# Patient Record
Sex: FEMALE | Race: WHITE | NOT HISPANIC OR LATINO | Employment: UNEMPLOYED | URBAN - METROPOLITAN AREA
[De-identification: names, ages, dates, MRNs, and addresses within clinical notes are randomized per-mention and may not be internally consistent; named-entity substitution may affect disease eponyms.]

---

## 2022-08-24 LAB
AMPHETAMINES UR QL: NEGATIVE
BUPRENORPHINE SERPL-MCNC: NEGATIVE NG/ML
C TRACH RRNA SPEC DONR QL NAA+PROBE: NEGATIVE
CANNABINOIDS SERPL QL: POSITIVE
COCAINE SERPL CFM-MCNC: NEGATIVE NG/ML
EXTERNAL ABO GROUPING: NORMAL
EXTERNAL AMPHETAMINE SCREEN URINE: NEGATIVE
EXTERNAL ANTIBODY SCREEN: NORMAL
EXTERNAL BARBITURATE SCREEN URINE: NEGATIVE
EXTERNAL BENZODIAZEPINE SCREEN URINE: NEGATIVE
EXTERNAL HEMATOCRIT: 34 %
EXTERNAL HEMOGLOBIN: 11.5 G/DL
EXTERNAL HEPATITIS B SURFACE ANTIGEN: NEGATIVE
EXTERNAL METHADONE SCREEN URINE: NEGATIVE
EXTERNAL PLATELET COUNT: 197 K/ΜL
EXTERNAL PROPOXYPHENE SCREEN URINE: NEGATIVE
EXTERNAL RH FACTOR: POSITIVE
EXTERNAL SYPHILIS RPR SCREEN: NORMAL
EXTERNAL THYROID STIMULATING HORMONE: 1.19 M[IU]/ML
HCV AB S/CO SERPL IA: REACTIVE
HIV 1+2 AB+HIV1 P24 AG SERPL QL IA: NEGATIVE
N GONORRHOEA DNA SPEC QL NAA+PROBE: NEGATIVE
OPIATES UR QL: NEGATIVE
OXYCODONE UR QL SCN: NEGATIVE
RUBV IGG SERPL IA-ACNC: NORMAL

## 2022-09-21 LAB
2ND TRIMESTER 4 SCREEN SERPL-IMP: NEGATIVE
HCV AB S/CO SERPL IA: NEGATIVE

## 2022-11-02 LAB
EXTERNAL HEMATOCRIT: 31 %
EXTERNAL HEMOGLOBIN: 10.2 G/DL
GLUCOSE 1H P 100 G GLC PO SERPL-MCNC: 131 MG/DL
PLATELET # BLD AUTO: 228.5 10*3/UL

## 2022-12-29 ENCOUNTER — TELEPHONE (OUTPATIENT)
Dept: OBSTETRICS AND GYNECOLOGY | Facility: CLINIC | Age: 22
End: 2022-12-29

## 2022-12-30 ENCOUNTER — TELEPHONE (OUTPATIENT)
Dept: OBSTETRICS AND GYNECOLOGY | Facility: CLINIC | Age: 22
End: 2022-12-30

## 2022-12-30 NOTE — TELEPHONE ENCOUNTER
Caller: BHARATH CAMARILLO  Reason for Call: PT IS CALLING TO CHECK ON THE STATUS OF HER RECORDS/TRANSFER OF CARE. PLEASE CALL PT TO UPDATE

## 2023-01-10 ENCOUNTER — INITIAL PRENATAL (OUTPATIENT)
Dept: OBSTETRICS AND GYNECOLOGY | Facility: CLINIC | Age: 23
End: 2023-01-10
Payer: COMMERCIAL

## 2023-01-10 VITALS
BODY MASS INDEX: 41.43 KG/M2 | SYSTOLIC BLOOD PRESSURE: 112 MMHG | WEIGHT: 211 LBS | HEIGHT: 60 IN | DIASTOLIC BLOOD PRESSURE: 62 MMHG

## 2023-01-10 DIAGNOSIS — Z34.83 PRENATAL CARE, SUBSEQUENT PREGNANCY, THIRD TRIMESTER: Primary | ICD-10-CM

## 2023-01-10 DIAGNOSIS — N89.8 VAGINAL DISCHARGE: ICD-10-CM

## 2023-01-10 LAB
GLUCOSE UR STRIP-MCNC: NEGATIVE MG/DL
PROT UR STRIP-MCNC: NEGATIVE MG/DL

## 2023-01-10 PROCEDURE — 99203 OFFICE O/P NEW LOW 30 MIN: CPT | Performed by: OBSTETRICS & GYNECOLOGY

## 2023-01-10 RX ORDER — FERROUS SULFATE 325(65) MG
1 TABLET ORAL AS NEEDED
COMMUNITY
Start: 2022-11-23
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
## 2023-01-13 LAB
C TRACH RRNA SPEC QL NAA+PROBE: NEGATIVE
Lab: NORMAL
N GONORRHOEA RRNA SPEC QL NAA+PROBE: NEGATIVE

## 2023-01-17 ENCOUNTER — ROUTINE PRENATAL (OUTPATIENT)
Dept: OBSTETRICS AND GYNECOLOGY | Facility: CLINIC | Age: 23
End: 2023-01-17
Payer: COMMERCIAL

## 2023-01-17 ENCOUNTER — LAB (OUTPATIENT)
Dept: LAB | Facility: HOSPITAL | Age: 23
End: 2023-01-17
Payer: COMMERCIAL

## 2023-01-17 VITALS — DIASTOLIC BLOOD PRESSURE: 62 MMHG | BODY MASS INDEX: 41.83 KG/M2 | WEIGHT: 214.2 LBS | SYSTOLIC BLOOD PRESSURE: 114 MMHG

## 2023-01-17 DIAGNOSIS — Z34.03 ENCOUNTER FOR SUPERVISION OF NORMAL FIRST PREGNANCY IN THIRD TRIMESTER: ICD-10-CM

## 2023-01-17 DIAGNOSIS — Z34.03 ENCOUNTER FOR SUPERVISION OF NORMAL FIRST PREGNANCY IN THIRD TRIMESTER: Primary | ICD-10-CM

## 2023-01-17 DIAGNOSIS — Z86.19 HISTORY OF HEPATITIS C: Primary | ICD-10-CM

## 2023-01-17 LAB
EXPIRATION DATE: 0
GLUCOSE UR STRIP-MCNC: NEGATIVE MG/DL
Lab: 0
PROT UR STRIP-MCNC: NEGATIVE MG/DL

## 2023-01-17 PROCEDURE — 99213 OFFICE O/P EST LOW 20 MIN: CPT | Performed by: NURSE PRACTITIONER

## 2023-01-17 PROCEDURE — 87081 CULTURE SCREEN ONLY: CPT

## 2023-01-17 NOTE — PROGRESS NOTES
OB FOLLOW UP  CC- Here for care of pregnancy        Vaishali Ramirez is a 22 y.o.  36w0d patient being seen today for her obstetrical follow up visit. Patient reports headache, tylenol resolves and x2 vomiting since last visit.     Her prenatal care is complicated by (and status) :   There is no problem list on file for this patient.      GBS Status: Done Today. She is not allergic to PCN.    No Known Allergies       Flu Status: Declines  Her Delivery Plan is: Undecided    US today: yes: cephalic, AF normal, HC 14%, AC 76%, EFW 44%  Non Stress Test: No.      ROS -   Patient Reports : headache and vomiting  Patient Denies: Loss of Fluid, Vaginal Spotting, Vision Changes, Nausea , Contractions and Epigastric pain  Fetal Movement : normal  All other systems reviewed and are negative.       The additional following portions of the patient's history were reviewed and updated as appropriate: allergies and current medications.    I have reviewed and agree with the HPI, ROS, and historical information as entered above. Niurka Cook, APRN      EXAM:     Prenatal Vitals  BP: 114/62  Weight: 97.2 kg (214 lb 3.2 oz)   Fetal Heart Rate: 133       Dilation/Effacement/Station  Dilation: 2      Urine Glucose Read-only: Negative  Urine Protein Read-only: Negative       Assessment and Plan    Problem List Items Addressed This Visit    None  Visit Diagnoses     History of hepatitis C    -  Primary    Relevant Orders    Hepatitis C RNA, Quantitative, PCR (graph)    US OB Follow Up Transabdominal Approach    Encounter for supervision of normal first pregnancy in third trimester        Relevant Orders    POC Glucose, Urine, Qualitative, Dipstick (Completed)    POC Protein, Urine, Qualitative, Dipstick (Completed)    Group B Streptococcus Culture - Swab, Vaginal/Rectum          1. Pregnancy at 36w 0d  2. GBS done today  3. Hepatitis C viral load labs today  4. Fetal status reassuring.   5. Reviewed Pre-eclampsia  signs/symptoms  6. Delivery options reviewed with patient  7. Signs of labor reviewed  8. Kick counts reviewed  9. Activity and Exercise discussed.  10. FU in about 1 week (1/25/23) with JOVAN Cook, APRN  01/17/2023

## 2023-01-19 LAB
HCV RNA SERPL NAA+PROBE-ACNC: NORMAL IU/ML
TEST INFORMATION: NORMAL

## 2023-01-20 LAB — BACTERIA SPEC AEROBE CULT: NORMAL

## 2023-01-25 ENCOUNTER — ROUTINE PRENATAL (OUTPATIENT)
Dept: OBSTETRICS AND GYNECOLOGY | Facility: CLINIC | Age: 23
End: 2023-01-25
Payer: COMMERCIAL

## 2023-01-25 VITALS — WEIGHT: 214.8 LBS | DIASTOLIC BLOOD PRESSURE: 68 MMHG | SYSTOLIC BLOOD PRESSURE: 108 MMHG | BODY MASS INDEX: 41.95 KG/M2

## 2023-01-25 DIAGNOSIS — Z34.83 PRENATAL CARE, SUBSEQUENT PREGNANCY, THIRD TRIMESTER: Primary | ICD-10-CM

## 2023-01-25 LAB
GLUCOSE UR STRIP-MCNC: NEGATIVE MG/DL
PROT UR STRIP-MCNC: NEGATIVE MG/DL

## 2023-01-25 PROCEDURE — 99213 OFFICE O/P EST LOW 20 MIN: CPT | Performed by: OBSTETRICS & GYNECOLOGY

## 2023-01-25 NOTE — PROGRESS NOTES
OB FOLLOW UP  CC- Here for care of pregnancy        Vaishali Ramirez is a 22 y.o.  37w1d patient being seen today for her obstetrical follow up visit. Patient reports occasional lower back pain and BH ctx. Patient denies any dysuria.     Her prenatal care is complicated by (and status) : None  There is no problem list on file for this patient.      GBS Status:   Group B Strep Culture   Date Value Ref Range Status   2023 No Group B Streptococcus isolated  Final         No Known Allergies       Flu Status: Declines  Her Delivery Plan is: Desires spontaneous labor    US today: no  Non Stress Test: No.       ROS -   Patient Reports : see above  Patient Denies: Loss of Fluid, Vaginal Spotting, Vision Changes, Headaches, Nausea , Vomiting  and Epigastric pain  Fetal Movement : normal  All other systems reviewed and are negative.       The additional following portions of the patient's history were reviewed and updated as appropriate: allergies and current medications.    I have reviewed and agree with the HPI, ROS, and historical information as entered above. Sue Michel MD      EXAM:     Prenatal Vitals  BP: 108/68  Weight: 97.4 kg (214 lb 12.8 oz)   Fetal Heart Rate: pos          cvx internal os closed    Urine Glucose Read-only: Negative  Urine Protein Read-only: Negative       Assessment and Plan    Problem List Items Addressed This Visit    None  Visit Diagnoses     Prenatal care, subsequent pregnancy, third trimester    -  Primary    Relevant Orders    POC Urinalysis Dipstick (Completed)    HCV Antibody Rfx To Qnt PCR          1. Pregnancy at 37w1d  2. Fetal status reassuring.   3. Reviewed Pre-eclampsia signs/symptoms  4. Delivery options reviewed with patient  5. Signs of labor reviewed  6. Kick counts reviewed  7. Activity and Exercise discussed.  Return in about 1 week (around 2023).    Sue Michel MD  2023

## 2023-01-26 LAB
HCV AB S/CO SERPL IA: <0.1 S/CO RATIO (ref 0–0.9)
HCV AB SERPL QL IA: NORMAL

## 2023-01-31 ENCOUNTER — ROUTINE PRENATAL (OUTPATIENT)
Dept: OBSTETRICS AND GYNECOLOGY | Facility: CLINIC | Age: 23
End: 2023-01-31
Payer: COMMERCIAL

## 2023-01-31 VITALS — WEIGHT: 220 LBS | DIASTOLIC BLOOD PRESSURE: 72 MMHG | BODY MASS INDEX: 42.97 KG/M2 | SYSTOLIC BLOOD PRESSURE: 102 MMHG

## 2023-01-31 DIAGNOSIS — E66.01 MORBID OBESITY WITH BMI OF 40.0-44.9, ADULT: ICD-10-CM

## 2023-01-31 DIAGNOSIS — Z34.83 PRENATAL CARE, SUBSEQUENT PREGNANCY, THIRD TRIMESTER: Primary | ICD-10-CM

## 2023-01-31 LAB
GLUCOSE UR STRIP-MCNC: NEGATIVE MG/DL
PROT UR STRIP-MCNC: NEGATIVE MG/DL

## 2023-01-31 PROCEDURE — 99213 OFFICE O/P EST LOW 20 MIN: CPT | Performed by: OBSTETRICS & GYNECOLOGY

## 2023-01-31 NOTE — PROGRESS NOTES
OB FOLLOW UP  CC- Here for care of pregnancy        Vaishali Ramirez is a 22 y.o.  38w0d patient being seen today for her obstetrical follow up visit. Patient reports BH contractions, heartburn (she is taking OTC medication for treatment currently), constipation, and hemorrhoids.     Patient requesting cervical check today.    Her prenatal care is complicated by (and status) :   Patient Active Problem List   Diagnosis   • Morbid obesity with BMI of 40.0-44.9, adult ([facility])       GBS Status:   Group B Strep Culture   Date Value Ref Range Status   2023 No Group B Streptococcus isolated  Final         No Known Allergies       Flu Status: Declines  Her Delivery Plan is: Does not desire IOL. Patient states that if she is not dilated to 4-5CM by next week, then she would like to discuss IOL further.    US today: No  Non Stress Test: No       ROS -   Patient Reports : see above  Patient Denies: Loss of Fluid, Vaginal Spotting, Vision Changes, Headaches, Nausea , Vomiting  and Epigastric pain  Fetal Movement : normal  All other systems reviewed and are negative.       The additional following portions of the patient's history were reviewed and updated as appropriate: allergies and current medications.    I have reviewed and agree with the HPI, ROS, and historical information as entered above. Sue Michel MD      EXAM:     Prenatal Vitals  BP: 102/72  Weight: 99.8 kg (220 lb)   Fetal Heart Rate: pos        FHT pos  Cervix 2/50      Urine Glucose Read-only: Negative  Urine Protein Read-only: Negative       Assessment and Plan    Problem List Items Addressed This Visit        Endocrine and Metabolic    Morbid obesity with BMI of 40.0-44.9, adult ([facility])   Other Visit Diagnoses     Prenatal care, subsequent pregnancy, third trimester    -  Primary    Relevant Orders    POC Urinalysis Dipstick (Completed)          1. Pregnancy at 38w0d  2. Fetal status reassuring.   3. Reviewed Pre-eclampsia  signs/symptoms  4. Delivery options reviewed with patient.  Desires induction on due date if undelivered  5. Signs of labor reviewed  6. Kick counts reviewed  7. Activity and Exercise discussed.  Return in about 1 week (around 2/7/2023).    Sue Michel MD  01/31/2023

## 2023-02-02 ENCOUNTER — TELEPHONE (OUTPATIENT)
Dept: OBSTETRICS AND GYNECOLOGY | Facility: CLINIC | Age: 23
End: 2023-02-02
Payer: COMMERCIAL

## 2023-02-02 NOTE — TELEPHONE ENCOUNTER
PT calling because she was seen at ER for BH contractions. Was told to reach out to the office to see if she needs to schedule a f/u.

## 2023-02-05 ENCOUNTER — HOSPITAL ENCOUNTER (OUTPATIENT)
Facility: HOSPITAL | Age: 23
Discharge: HOME | End: 2023-02-06
Attending: OBSTETRICS & GYNECOLOGY | Admitting: OBSTETRICS & GYNECOLOGY
Payer: COMMERCIAL

## 2023-02-05 ENCOUNTER — HOSPITAL ENCOUNTER (OUTPATIENT)
Facility: HOSPITAL | Age: 23
Discharge: HOME | End: 2023-02-05
Attending: OBSTETRICS & GYNECOLOGY | Admitting: OBSTETRICS & GYNECOLOGY
Payer: COMMERCIAL

## 2023-02-05 VITALS
WEIGHT: 220 LBS | TEMPERATURE: 98 F | RESPIRATION RATE: 20 BRPM | HEIGHT: 60 IN | DIASTOLIC BLOOD PRESSURE: 71 MMHG | BODY MASS INDEX: 43.19 KG/M2 | SYSTOLIC BLOOD PRESSURE: 112 MMHG | HEART RATE: 75 BPM

## 2023-02-05 VITALS
DIASTOLIC BLOOD PRESSURE: 72 MMHG | SYSTOLIC BLOOD PRESSURE: 112 MMHG | TEMPERATURE: 97.9 F | HEART RATE: 88 BPM | RESPIRATION RATE: 18 BRPM

## 2023-02-05 PROBLEM — Z34.90 PREGNANCY: Status: ACTIVE | Noted: 2023-02-05

## 2023-02-05 PROCEDURE — 59025 FETAL NON-STRESS TEST: CPT

## 2023-02-05 PROCEDURE — 99212 OFFICE O/P EST SF 10 MIN: CPT | Performed by: OBSTETRICS & GYNECOLOGY

## 2023-02-05 PROCEDURE — G0463 HOSPITAL OUTPT CLINIC VISIT: HCPCS

## 2023-02-05 PROCEDURE — 59025 FETAL NON-STRESS TEST: CPT | Performed by: OBSTETRICS & GYNECOLOGY

## 2023-02-05 NOTE — H&P
[facility]  Obstetric History and Physical    Referring Provider: Sue Michel MD      Chief Complaint   Patient presents with   • Contractions     Started having ctxs at 0341.  Have been 3 min apart and lasting 3 min.  Now 2 min apart.         Subjective        Patient is a 22 y.o. female  currently at 38w5d, who presents with complaint of contractions.  Patient reports mild to moderate contractions that began at 341 this morning, every 3 minutes without any associated leaking of fluid or vaginal bleeding.  Patient denies recent trauma, fever, urinary symptoms or any other concerns.  Patient reports normal fetal activity.  Patient observed for several hours without cervical change with irregular contractions.  GBS negative        The following portions of the patients history were reviewed and updated as appropriate: current medications, allergies, past medical history, past surgical history, past family history, past social history and problem list .       Prenatal Information:   Maternal Prenatal Labs  Blood Type No results found for: ABO   Rh Status No results found for: RH   Antibody Screen No results found for: ABSCRN   Gonnorhea No results found for: GCCX   Chlamydia No results found for: CLAMYDCU   RPR No results found for: RPR   Syphilis Antibody No results found for: SYPHILIS   Rubella No results found for: RUBELLAIGGIN   Hepatitis B Surface Antigen No results found for: HEPBSAG   HIV-1 Antibody No results found for: LABHIV1   Hepatitis C Antibody No results found for: HEPCAB   Rapid Urin Drug Screen No results found for: AMPMETHU, BARBITSCNUR, LABBENZSCN, LABMETHSCN, LABOPIASCN, THCURSCR, COCAINEUR, AMPHETSCREEN, PROPOXSCN, BUPRENORSCNU, METAMPSCNUR, OXYCODONESCN, TRICYCLICSCN   Group B Strep Culture No results found for: GBSANTIGEN           External Prenatal Results     Pregnancy Outside Results - Transcribed From Office Records - See Scanned Records For Details     Test Value Date Time     ABO ^ AB  22     Rh ^ Positive  22     Antibody Screen ^ Normal  22     Varicella IgG       Rubella ^ Immune  22     Hgb ^ 10.2 g/dL 22       ^ 11.5 g/dL 22     Hct ^ 31 % 22       ^ 34 % 22     Glucose Fasting GTT       Glucose Tolerance Test 1 hour ^ 131  22     Glucose Tolerance Test 3 hour       Gonorrhea (discrete)  Negative  01/10/23       ^ Negative  22     Chlamydia (discrete)  Negative  01/10/23       ^ Negative  22     RPR ^ Non-Reactive  22     VDRL       Syphilis Antibody       HBsAg ^ Negative  22     Herpes Simplex Virus PCR       Herpes Simplex VIrus Culture       HIV ^ Negative  22     Hep C RNA Quant PCR       Hep C Antibody ^ Negative  22       ^ Reactive  22     AFP       Group B Strep  No Group B Streptococcus isolated  23 1806    GBS Susceptibility to Clindamycin       GBS Susceptibility to Erythromycin       Fetal Fibronectin       Genetic Testing, Maternal Blood             Drug Screening     Test Value Date Time    Urine Drug Screen       Amphetamine Screen ^ Negative  22     Barbiturate Screen ^ Negative  22     Benzodiazepine Screen ^ Negative  22     Methadone Screen ^ Negative  22     Phencyclidine Screen       Opiates Screen ^ Negative  22     THC Screen ^ Positive  22     Cocaine Screen ^ Negative  22     Propoxyphene Screen ^ Negative  22     Buprenorphine Screen ^ Negative  22     Methamphetamine Screen       Oxycodone Screen ^ Negative  22     Tricyclic Antidepressants Screen             Legend    ^: Historical                          Past OB History:       OB History    Para Term  AB Living   2 1 1 0 0 1   SAB IAB Ectopic Molar Multiple Live Births   0 0 0 0 0 1      # Outcome Date GA Lbr Jabari/2nd Weight Sex Delivery Anes PTL Lv   2 Current            1 Term 22 40w1d  3685 g (8 lb 2 oz) M Vag-Spont   MICHELLE        Past Medical History: History reviewed. No pertinent past medical history.   Past Surgical History Past Surgical History:   Procedure Laterality Date   • TONSILLECTOMY AND ADENOIDECTOMY        Family History: Family History   Problem Relation Age of Onset   • Breast cancer Neg Hx    • Colon cancer Neg Hx    • Ovarian cancer Neg Hx    • Uterine cancer Neg Hx       Social History:  reports that she quit smoking about 1 years ago. Her smoking use included cigarettes. She has never used smokeless tobacco.   reports that she does not currently use alcohol.   reports no history of drug use.                   General ROS Negative Findings:Headaches, Visual Changes, Epigastric pain, Anorexia, Nausia/Vomiting, ROM and Vaginal Bleeding    ROS     All other systems have been reviewed and are neg  Objective       Vital Signs Range for the last 24 hours  Temperature: Temp:  [98 °F (36.7 °C)] 98 °F (36.7 °C)   Temp Source: Temp src: Oral   BP: BP: (112)/(71) 112/71   Pulse: Heart Rate:  [75] 75   Respirations: Resp:  [20] 20   SPO2:     O2 Amount (l/min):     O2 Devices     Weight: Weight:  [99.8 kg (220 lb)] 99.8 kg (220 lb)     Physical Examination:   General:   alert, appears stated age and cooperative   Skin:   normal   HEENT:  Sclera clear   Lungs:      Heart:      Gastrointestinal:  Abdomen soft gravid uterus nontender, no guarding benign exam   Lower Extremities:  Trace edema   : Exam deferred.   Musculoskeletal:     Neuro:  No focal deficit noted.         Presentation:  Vertex   Cervix: Exam by: Method: sterile exam per RN   Dilation:  3 to 4 cm   Effacement: Cervical Effacement: 60%   Station:  -2       Fetal Heart Rate Assessment   Method: Fetal HR Assessment Method: external   Beats/min: Fetal HR (beats/min): 120   Baseline: Fetal HR Baseline: normal range   Varibility: Fetal HR Variability: moderate (amplitude range 6 to 25 bpm)   Accels: Fetal HR Accelerations: greater than/equal to 15 bpm   Decels: Fetal HR  Decelerations: absent   Tracing Category:     NST-indications contractions, interpretation reactive, moderate variability, accelerations present 15 x 15, no fetal decelerations noted, onset 0444 end time 0700  Uterine Assessment   Method: Method: external tocotransducer   Frequency (min): Contraction Frequency (Minutes): 4   Ctx Count in 10 min:     Duration:     Intensity: Contraction Intensity: mild by palpation   Intensity by IUPC:     Resting Tone: Uterine Resting Tone: soft by palpation   Resting Tone by IUPC:     [place] Units:       Laboratory Results:   Lab Results (last 24 hours)     ** No results found for the last 24 hours. **        Radiology Review:   Imaging Results (Last 24 Hours)     ** No results found for the last 24 hours. **        Other Studies:    Assessment & Plan       Pregnancy    Morbid obesity with BMI of 40.0-44.9, adult ([facility])        Assessment:  1.  Intrauterine pregnancy at 38w5d weeks gestation with reactive fetal status.    2.  False labor  3.  GBS negative  4.  Morbid obesity    Plan:  1.  Discharged home, kick count, labor instructions given, follow-up with OB provider routine or.  2. Plan of care has been reviewed with patient.  3.  Risks, benefits of treatment plan have been discussed.  4.  All questions have been answered.  5      Efrain Noe,   2/5/2023  06:58 EST

## 2023-02-06 NOTE — H&P
[facility]  Obstetric History and Physical    Referring Provider: Sue Michel MD      Chief Complaint   Patient presents with   • Contractions       Subjective     Patient is a 22 y.o. female  currently at 38w5d, who presents with complaint of contractions every 5 to 7 minutes throughout the day without leaking fluid or vaginal bleeding.  Patient ports normal fetal activity.  Patient was seen earlier this morning in labor and delivery without labor.  Patient observed for 2 hours without cervical change.         The following portions of the patients history were reviewed and updated as appropriate: current medications, allergies, past medical history, past surgical history, past family history, past social history and problem list .       Prenatal Information:   Maternal Prenatal Labs  Blood Type No results found for: ABO   Rh Status No results found for: RH   Antibody Screen No results found for: ABSCRN   Gonnorhea No results found for: GCCX   Chlamydia No results found for: CLAMYDCU   RPR No results found for: RPR   Syphilis Antibody No results found for: SYPHILIS   Rubella No results found for: RUBELLAIGGIN   Hepatitis B Surface Antigen No results found for: HEPBSAG   HIV-1 Antibody No results found for: LABHIV1   Hepatitis C Antibody No results found for: HEPCAB   Rapid Urin Drug Screen No results found for: AMPMETHU, BARBITSCNUR, LABBENZSCN, LABMETHSCN, LABOPIASCN, THCURSCR, COCAINEUR, AMPHETSCREEN, PROPOXSCN, BUPRENORSCNU, METAMPSCNUR, OXYCODONESCN, TRICYCLICSCN   Group B Strep Culture No results found for: GBSANTIGEN           External Prenatal Results     Pregnancy Outside Results - Transcribed From Office Records - See Scanned Records For Details     Test Value Date Time    ABO ^ AB  22     Rh ^ Positive  22     Antibody Screen ^ Normal  22     Varicella IgG       Rubella ^ Immune  22     Hgb ^ 10.2 g/dL 22       ^ 11.5 g/dL 22     Hct ^ 31 % 22       ^  34 % 22     Glucose Fasting GTT       Glucose Tolerance Test 1 hour ^ 131  22     Glucose Tolerance Test 3 hour       Gonorrhea (discrete)  Negative  01/10/23       ^ Negative  22     Chlamydia (discrete)  Negative  01/10/23       ^ Negative  22     RPR ^ Non-Reactive  22     VDRL       Syphilis Antibody       HBsAg ^ Negative  22     Herpes Simplex Virus PCR       Herpes Simplex VIrus Culture       HIV ^ Negative  22     Hep C RNA Quant PCR       Hep C Antibody ^ Negative  22       ^ Reactive  22     AFP       Group B Strep  No Group B Streptococcus isolated  23 1806    GBS Susceptibility to Clindamycin       GBS Susceptibility to Erythromycin       Fetal Fibronectin       Genetic Testing, Maternal Blood             Drug Screening     Test Value Date Time    Urine Drug Screen       Amphetamine Screen ^ Negative  22     Barbiturate Screen ^ Negative  22     Benzodiazepine Screen ^ Negative  22     Methadone Screen ^ Negative  22     Phencyclidine Screen       Opiates Screen ^ Negative  22     THC Screen ^ Positive  22     Cocaine Screen ^ Negative  22     Propoxyphene Screen ^ Negative  22     Buprenorphine Screen ^ Negative  22     Methamphetamine Screen       Oxycodone Screen ^ Negative  22     Tricyclic Antidepressants Screen             Legend    ^: Historical                          Past OB History:       OB History    Para Term  AB Living   2 1 1 0 0 1   SAB IAB Ectopic Molar Multiple Live Births   0 0 0 0 0 1      # Outcome Date GA Lbr Jabari/2nd Weight Sex Delivery Anes PTL Lv   2 Current            1 Term 22 40w1d  3685 g (8 lb 2 oz) M Vag-Spont   MICHELLE       Past Medical History: History reviewed. No pertinent past medical history.   Past Surgical History Past Surgical History:   Procedure Laterality Date   • TONSILLECTOMY AND ADENOIDECTOMY        Family History: Family  History   Problem Relation Age of Onset   • Breast cancer Neg Hx    • Colon cancer Neg Hx    • Ovarian cancer Neg Hx    • Uterine cancer Neg Hx       Social History:  reports that she quit smoking about 1 years ago. Her smoking use included cigarettes. She has never used smokeless tobacco.   reports that she does not currently use alcohol.   reports no history of drug use.                   General ROS Negative Findings:Headaches, Visual Changes, Epigastric pain, Anorexia, Nausia/Vomiting, ROM and Vaginal Bleeding    ROS     All other systems have been reviewed and are neg  Objective       Vital Signs Range for the last 24 hours  Temperature: Temp:  [97.9 °F (36.6 °C)-98 °F (36.7 °C)] 97.9 °F (36.6 °C)   Temp Source: Temp src: Oral   BP: BP: (112)/(71-72) 112/72   Pulse: Heart Rate:  [75-88] 88   Respirations: Resp:  [18-20] 18   SPO2:     O2 Amount (l/min):     O2 Devices     Weight: Weight:  [99.8 kg (220 lb)] 99.8 kg (220 lb)     Physical Examination:   General:   alert, appears stated age and cooperative   Skin:   normal   HEENT:     Lungs:      Heart:      Gastrointestinal:  Abdomen soft, gravid uterus, guarding benign exam   Lower Extremities:  Trace edema, no calf tenderness   : External genitalia: normal general appearance  Uterus: enlarged   Musculoskeletal:     Neuro:  No focal deficits noted DTR 2 + over 4 no clonus         Presentation: vtx   Cervix: Exam by: Method: sterile exam per RN   Dilation:  3 cm   Effacement: Cervical Effacement: 60-70%   Station:         Fetal Heart Rate Assessment   Method:     Beats/min:     Baseline:     Varibility:     Accels:     Decels:     Tracing Category:     NST-indications contractions, interpretation reactive, moderate variability, multiple accelerations present 15 x 15, 1 isolated early deceleration noted, onset 2208 end time 0008, irregular contractions noted  Uterine Assessment   Method:     Frequency (min):     Ctx Count in 10 min:     Duration:     Intensity:      Intensity by IUPC:     Resting Tone:     Resting Tone by IUPC:     Lorena Units:       Laboratory Results:   Lab Results (last 24 hours)     ** No results found for the last 24 hours. **        Radiology Review:   Imaging Results (Last 24 Hours)     ** No results found for the last 24 hours. **        Other Studies:    Assessment & Plan       Pregnancy        Assessment:  1.  Intrauterine pregnancy at 38w5d weeks gestation with reactive fetal status.    2.  False labor  3.  GBS negative  4.      Plan:  1.  Discharged home, kick count, labor structures given, follow-up OB provider routinely.  Follow-up OB provider this week routine prenatal visitor prn    2. Plan of care has been reviewed with patient.  3.  Risks, benefits of treatment plan have been discussed.  4.  All questions have been answered.  5   discussed with Dr. Al Noe,   2/5/2023  22:27 EST

## 2023-02-07 ENCOUNTER — HOSPITAL ENCOUNTER (INPATIENT)
Facility: HOSPITAL | Age: 23
LOS: 2 days | Discharge: HOME | End: 2023-02-09
Attending: OBSTETRICS & GYNECOLOGY | Admitting: OBSTETRICS & GYNECOLOGY
Payer: COMMERCIAL

## 2023-02-07 ENCOUNTER — ANESTHESIA EVENT (OUTPATIENT)
Dept: LABOR AND DELIVERY | Facility: HOSPITAL | Age: 23
End: 2023-02-07
Payer: COMMERCIAL

## 2023-02-07 ENCOUNTER — ANESTHESIA (OUTPATIENT)
Dept: LABOR AND DELIVERY | Facility: HOSPITAL | Age: 23
End: 2023-02-07
Payer: COMMERCIAL

## 2023-02-07 PROBLEM — Z34.90 TERM PREGNANCY: Status: ACTIVE | Noted: 2023-02-07

## 2023-02-07 LAB
ABO GROUP BLD: NORMAL
ABO GROUP BLD: NORMAL
ALP SERPL-CCNC: 133 U/L (ref 39–117)
ALT SERPL W P-5'-P-CCNC: 6 U/L (ref 1–33)
AST SERPL-CCNC: 12 U/L (ref 1–32)
BILIRUB SERPL-MCNC: 0.2 MG/DL (ref 0–1.2)
BLD GP AB SCN SERPL QL: NEGATIVE
CREAT SERPL-MCNC: 0.73 MG/DL (ref 0.57–1)
DEPRECATED RDW RBC AUTO: 46.8 FL (ref 37–54)
ERYTHROCYTE [DISTWIDTH] IN BLOOD BY AUTOMATED COUNT: 17.2 % (ref 12.3–15.4)
HCT VFR BLD AUTO: 32.2 % (ref 34–46.6)
HGB BLD-MCNC: 10.2 G/DL (ref 12–15.9)
LDH SERPL-CCNC: 197 U/L (ref 135–214)
MCH RBC QN AUTO: 24.1 PG (ref 26.6–33)
MCHC RBC AUTO-ENTMCNC: 31.7 G/DL (ref 31.5–35.7)
MCV RBC AUTO: 76.1 FL (ref 79–97)
PLATELET # BLD AUTO: 228 10*3/MM3 (ref 140–450)
PMV BLD AUTO: 11.9 FL (ref 6–12)
RBC # BLD AUTO: 4.23 10*6/MM3 (ref 3.77–5.28)
RH BLD: POSITIVE
RH BLD: POSITIVE
T&S EXPIRATION DATE: NORMAL
URATE SERPL-MCNC: 4.9 MG/DL (ref 2.4–5.7)
WBC NRBC COR # BLD: 11.55 10*3/MM3 (ref 3.4–10.8)

## 2023-02-07 PROCEDURE — 0UQMXZZ REPAIR VULVA, EXTERNAL APPROACH: ICD-10-PCS | Performed by: OBSTETRICS & GYNECOLOGY

## 2023-02-07 PROCEDURE — C1755 CATHETER, INTRASPINAL: HCPCS | Performed by: ANESTHESIOLOGY

## 2023-02-07 PROCEDURE — 86900 BLOOD TYPING SEROLOGIC ABO: CPT | Performed by: OBSTETRICS & GYNECOLOGY

## 2023-02-07 PROCEDURE — 59410 OBSTETRICAL CARE: CPT | Performed by: OBSTETRICS & GYNECOLOGY

## 2023-02-07 PROCEDURE — 25010000002 ONDANSETRON PER 1 MG: Performed by: OBSTETRICS & GYNECOLOGY

## 2023-02-07 PROCEDURE — 86901 BLOOD TYPING SEROLOGIC RH(D): CPT | Performed by: OBSTETRICS & GYNECOLOGY

## 2023-02-07 PROCEDURE — 86900 BLOOD TYPING SEROLOGIC ABO: CPT

## 2023-02-07 PROCEDURE — 83615 LACTATE (LD) (LDH) ENZYME: CPT | Performed by: OBSTETRICS & GYNECOLOGY

## 2023-02-07 PROCEDURE — 85027 COMPLETE CBC AUTOMATED: CPT | Performed by: OBSTETRICS & GYNECOLOGY

## 2023-02-07 PROCEDURE — 84550 ASSAY OF BLOOD/URIC ACID: CPT | Performed by: OBSTETRICS & GYNECOLOGY

## 2023-02-07 PROCEDURE — 84075 ASSAY ALKALINE PHOSPHATASE: CPT | Performed by: OBSTETRICS & GYNECOLOGY

## 2023-02-07 PROCEDURE — 84450 TRANSFERASE (AST) (SGOT): CPT | Performed by: OBSTETRICS & GYNECOLOGY

## 2023-02-07 PROCEDURE — 82247 BILIRUBIN TOTAL: CPT | Performed by: OBSTETRICS & GYNECOLOGY

## 2023-02-07 PROCEDURE — 36415 COLL VENOUS BLD VENIPUNCTURE: CPT | Performed by: OBSTETRICS & GYNECOLOGY

## 2023-02-07 PROCEDURE — 25010000002 ROPIVACAINE PER 1 MG: Performed by: ANESTHESIOLOGY

## 2023-02-07 PROCEDURE — 25010000002 OXYTOCIN PER 10 UNITS: Performed by: OBSTETRICS & GYNECOLOGY

## 2023-02-07 PROCEDURE — 25010000002 FENTANYL CITRATE (PF) 50 MCG/ML SOLUTION: Performed by: ANESTHESIOLOGY

## 2023-02-07 PROCEDURE — 84460 ALANINE AMINO (ALT) (SGPT): CPT | Performed by: OBSTETRICS & GYNECOLOGY

## 2023-02-07 PROCEDURE — 86850 RBC ANTIBODY SCREEN: CPT | Performed by: OBSTETRICS & GYNECOLOGY

## 2023-02-07 PROCEDURE — 86901 BLOOD TYPING SEROLOGIC RH(D): CPT

## 2023-02-07 PROCEDURE — 82565 ASSAY OF CREATININE: CPT | Performed by: OBSTETRICS & GYNECOLOGY

## 2023-02-07 RX ORDER — ROPIVACAINE HYDROCHLORIDE 2 MG/ML
15 INJECTION, SOLUTION EPIDURAL; INFILTRATION; PERINEURAL CONTINUOUS
Status: DISCONTINUED | OUTPATIENT
Start: 2023-02-07 | End: 2023-02-08

## 2023-02-07 RX ORDER — BUPIVACAINE HYDROCHLORIDE 2.5 MG/ML
INJECTION, SOLUTION EPIDURAL; INFILTRATION; INTRACAUDAL AS NEEDED
Status: DISCONTINUED | OUTPATIENT
Start: 2023-02-07 | End: 2023-02-08 | Stop reason: SURG

## 2023-02-07 RX ORDER — OXYTOCIN/0.9 % SODIUM CHLORIDE 30/500 ML
85 PLASTIC BAG, INJECTION (ML) INTRAVENOUS ONCE
Status: DISCONTINUED | OUTPATIENT
Start: 2023-02-08 | End: 2023-02-08 | Stop reason: HOSPADM

## 2023-02-07 RX ORDER — MORPHINE SULFATE 2 MG/ML
2 INJECTION, SOLUTION INTRAMUSCULAR; INTRAVENOUS
Status: DISCONTINUED | OUTPATIENT
Start: 2023-02-07 | End: 2023-02-08 | Stop reason: HOSPADM

## 2023-02-07 RX ORDER — FAMOTIDINE 10 MG/ML
20 INJECTION, SOLUTION INTRAVENOUS ONCE AS NEEDED
Status: DISCONTINUED | OUTPATIENT
Start: 2023-02-07 | End: 2023-02-08 | Stop reason: HOSPADM

## 2023-02-07 RX ORDER — LIDOCAINE HYDROCHLORIDE 10 MG/ML
5 INJECTION, SOLUTION EPIDURAL; INFILTRATION; INTRACAUDAL; PERINEURAL AS NEEDED
Status: DISCONTINUED | OUTPATIENT
Start: 2023-02-07 | End: 2023-02-08 | Stop reason: HOSPADM

## 2023-02-07 RX ORDER — OXYTOCIN/0.9 % SODIUM CHLORIDE 30/500 ML
2-20 PLASTIC BAG, INJECTION (ML) INTRAVENOUS
Status: DISCONTINUED | OUTPATIENT
Start: 2023-02-07 | End: 2023-02-08

## 2023-02-07 RX ORDER — MORPHINE SULFATE 2 MG/ML
2 INJECTION, SOLUTION INTRAMUSCULAR; INTRAVENOUS
Status: DISCONTINUED | OUTPATIENT
Start: 2023-02-07 | End: 2023-02-07 | Stop reason: SDUPTHER

## 2023-02-07 RX ORDER — ACETAMINOPHEN 325 MG/1
650 TABLET ORAL EVERY 4 HOURS PRN
Status: DISCONTINUED | OUTPATIENT
Start: 2023-02-07 | End: 2023-02-08 | Stop reason: HOSPADM

## 2023-02-07 RX ORDER — IBUPROFEN 600 MG/1
600 TABLET ORAL EVERY 6 HOURS PRN
Status: DISCONTINUED | OUTPATIENT
Start: 2023-02-07 | End: 2023-02-08 | Stop reason: HOSPADM

## 2023-02-07 RX ORDER — DIPHENHYDRAMINE HYDROCHLORIDE 50 MG/ML
12.5 INJECTION INTRAMUSCULAR; INTRAVENOUS EVERY 8 HOURS PRN
Status: DISCONTINUED | OUTPATIENT
Start: 2023-02-07 | End: 2023-02-08 | Stop reason: HOSPADM

## 2023-02-07 RX ORDER — PROMETHAZINE HYDROCHLORIDE 12.5 MG/1
12.5 TABLET ORAL EVERY 6 HOURS PRN
Status: DISCONTINUED | OUTPATIENT
Start: 2023-02-07 | End: 2023-02-08 | Stop reason: HOSPADM

## 2023-02-07 RX ORDER — ONDANSETRON 2 MG/ML
4 INJECTION INTRAMUSCULAR; INTRAVENOUS ONCE AS NEEDED
Status: DISCONTINUED | OUTPATIENT
Start: 2023-02-07 | End: 2023-02-07 | Stop reason: SDUPTHER

## 2023-02-07 RX ORDER — FENTANYL CITRATE 50 UG/ML
INJECTION, SOLUTION INTRAMUSCULAR; INTRAVENOUS AS NEEDED
Status: DISCONTINUED | OUTPATIENT
Start: 2023-02-07 | End: 2023-02-08 | Stop reason: SURG

## 2023-02-07 RX ORDER — ACETAMINOPHEN 325 MG/1
650 TABLET ORAL EVERY 4 HOURS PRN
Status: DISCONTINUED | OUTPATIENT
Start: 2023-02-07 | End: 2023-02-07 | Stop reason: SDUPTHER

## 2023-02-07 RX ORDER — SODIUM CHLORIDE, SODIUM LACTATE, POTASSIUM CHLORIDE, CALCIUM CHLORIDE 600; 310; 30; 20 MG/100ML; MG/100ML; MG/100ML; MG/100ML
125 INJECTION, SOLUTION INTRAVENOUS CONTINUOUS
Status: DISCONTINUED | OUTPATIENT
Start: 2023-02-07 | End: 2023-02-08

## 2023-02-07 RX ORDER — PROMETHAZINE HYDROCHLORIDE 12.5 MG/1
12.5 SUPPOSITORY RECTAL EVERY 6 HOURS PRN
Status: DISCONTINUED | OUTPATIENT
Start: 2023-02-07 | End: 2023-02-08 | Stop reason: HOSPADM

## 2023-02-07 RX ORDER — TRISODIUM CITRATE DIHYDRATE AND CITRIC ACID MONOHYDRATE 500; 334 MG/5ML; MG/5ML
30 SOLUTION ORAL ONCE
Status: DISCONTINUED | OUTPATIENT
Start: 2023-02-07 | End: 2023-02-08 | Stop reason: HOSPADM

## 2023-02-07 RX ORDER — LIDOCAINE HYDROCHLORIDE AND EPINEPHRINE 15; 5 MG/ML; UG/ML
INJECTION, SOLUTION EPIDURAL AS NEEDED
Status: DISCONTINUED | OUTPATIENT
Start: 2023-02-07 | End: 2023-02-08 | Stop reason: SURG

## 2023-02-07 RX ORDER — SODIUM CHLORIDE 0.9 % (FLUSH) 0.9 %
10 SYRINGE (ML) INJECTION AS NEEDED
Status: DISCONTINUED | OUTPATIENT
Start: 2023-02-07 | End: 2023-02-08 | Stop reason: HOSPADM

## 2023-02-07 RX ORDER — EPHEDRINE SULFATE 5 MG/ML
10 INJECTION INTRAVENOUS
Status: DISCONTINUED | OUTPATIENT
Start: 2023-02-07 | End: 2023-02-08 | Stop reason: HOSPADM

## 2023-02-07 RX ORDER — ONDANSETRON 4 MG/1
4 TABLET, FILM COATED ORAL EVERY 6 HOURS PRN
Status: DISCONTINUED | OUTPATIENT
Start: 2023-02-07 | End: 2023-02-08 | Stop reason: HOSPADM

## 2023-02-07 RX ORDER — OXYTOCIN/0.9 % SODIUM CHLORIDE 30/500 ML
650 PLASTIC BAG, INJECTION (ML) INTRAVENOUS ONCE
Status: DISCONTINUED | OUTPATIENT
Start: 2023-02-08 | End: 2023-02-08 | Stop reason: HOSPADM

## 2023-02-07 RX ORDER — SODIUM CHLORIDE 0.9 % (FLUSH) 0.9 %
10 SYRINGE (ML) INJECTION EVERY 12 HOURS SCHEDULED
Status: DISCONTINUED | OUTPATIENT
Start: 2023-02-07 | End: 2023-02-08 | Stop reason: HOSPADM

## 2023-02-07 RX ORDER — ONDANSETRON 2 MG/ML
4 INJECTION INTRAMUSCULAR; INTRAVENOUS EVERY 6 HOURS PRN
Status: DISCONTINUED | OUTPATIENT
Start: 2023-02-07 | End: 2023-02-08 | Stop reason: HOSPADM

## 2023-02-07 RX ORDER — MAGNESIUM CARB/ALUMINUM HYDROX 105-160MG
30 TABLET,CHEWABLE ORAL ONCE
Status: DISCONTINUED | OUTPATIENT
Start: 2023-02-07 | End: 2023-02-08 | Stop reason: HOSPADM

## 2023-02-07 RX ADMIN — SODIUM CHLORIDE, POTASSIUM CHLORIDE, SODIUM LACTATE AND CALCIUM CHLORIDE 1000 ML: 600; 310; 30; 20 INJECTION, SOLUTION INTRAVENOUS at 19:27

## 2023-02-07 RX ADMIN — LIDOCAINE HYDROCHLORIDE AND EPINEPHRINE 2 ML: 15; 5 INJECTION, SOLUTION EPIDURAL at 20:08

## 2023-02-07 RX ADMIN — EPHEDRINE SULFATE 10 MG: 5 INJECTION INTRAVENOUS at 22:10

## 2023-02-07 RX ADMIN — ONDANSETRON 4 MG: 2 INJECTION INTRAMUSCULAR; INTRAVENOUS at 21:22

## 2023-02-07 RX ADMIN — LIDOCAINE HYDROCHLORIDE AND EPINEPHRINE 3 ML: 15; 5 INJECTION, SOLUTION EPIDURAL at 20:07

## 2023-02-07 RX ADMIN — EPHEDRINE SULFATE 10 MG: 5 INJECTION INTRAVENOUS at 21:16

## 2023-02-07 RX ADMIN — ROPIVACAINE HYDROCHLORIDE 15 ML/HR: 2 INJECTION, SOLUTION EPIDURAL; INFILTRATION at 20:14

## 2023-02-07 RX ADMIN — FENTANYL CITRATE 100 MCG: 50 INJECTION, SOLUTION INTRAMUSCULAR; INTRAVENOUS at 20:09

## 2023-02-07 RX ADMIN — SODIUM CHLORIDE, POTASSIUM CHLORIDE, SODIUM LACTATE AND CALCIUM CHLORIDE 125 ML/HR: 600; 310; 30; 20 INJECTION, SOLUTION INTRAVENOUS at 20:21

## 2023-02-07 RX ADMIN — OXYTOCIN 2 MILLI-UNITS/MIN: 10 INJECTION, SOLUTION INTRAMUSCULAR; INTRAVENOUS at 21:28

## 2023-02-07 RX ADMIN — BUPIVACAINE HYDROCHLORIDE 12 ML: 2.5 INJECTION, SOLUTION EPIDURAL; INFILTRATION; INTRACAUDAL; PERINEURAL at 20:10

## 2023-02-08 LAB
BASOPHILS # BLD AUTO: 0.03 10*3/MM3 (ref 0–0.2)
BASOPHILS NFR BLD AUTO: 0.3 % (ref 0–1.5)
DEPRECATED RDW RBC AUTO: 48.5 FL (ref 37–54)
EOSINOPHIL # BLD AUTO: 0.06 10*3/MM3 (ref 0–0.4)
EOSINOPHIL NFR BLD AUTO: 0.5 % (ref 0.3–6.2)
ERYTHROCYTE [DISTWIDTH] IN BLOOD BY AUTOMATED COUNT: 17.2 % (ref 12.3–15.4)
HCT VFR BLD AUTO: 28.2 % (ref 34–46.6)
HGB BLD-MCNC: 8.5 G/DL (ref 12–15.9)
IMM GRANULOCYTES # BLD AUTO: 0.08 10*3/MM3 (ref 0–0.05)
IMM GRANULOCYTES NFR BLD AUTO: 0.7 % (ref 0–0.5)
LYMPHOCYTES # BLD AUTO: 2.06 10*3/MM3 (ref 0.7–3.1)
LYMPHOCYTES NFR BLD AUTO: 17.5 % (ref 19.6–45.3)
MCH RBC QN AUTO: 23.5 PG (ref 26.6–33)
MCHC RBC AUTO-ENTMCNC: 30.1 G/DL (ref 31.5–35.7)
MCV RBC AUTO: 78.1 FL (ref 79–97)
MONOCYTES # BLD AUTO: 0.68 10*3/MM3 (ref 0.1–0.9)
MONOCYTES NFR BLD AUTO: 5.8 % (ref 5–12)
NEUTROPHILS NFR BLD AUTO: 75.2 % (ref 42.7–76)
NEUTROPHILS NFR BLD AUTO: 8.88 10*3/MM3 (ref 1.7–7)
NRBC BLD AUTO-RTO: 0 /100 WBC (ref 0–0.2)
PLATELET # BLD AUTO: 196 10*3/MM3 (ref 140–450)
PMV BLD AUTO: 12.4 FL (ref 6–12)
RBC # BLD AUTO: 3.61 10*6/MM3 (ref 3.77–5.28)
WBC NRBC COR # BLD: 11.79 10*3/MM3 (ref 3.4–10.8)

## 2023-02-08 PROCEDURE — 0503F POSTPARTUM CARE VISIT: CPT

## 2023-02-08 PROCEDURE — 85025 COMPLETE CBC W/AUTO DIFF WBC: CPT | Performed by: OBSTETRICS & GYNECOLOGY

## 2023-02-08 PROCEDURE — C1755 CATHETER, INTRASPINAL: HCPCS

## 2023-02-08 PROCEDURE — 51703 INSERT BLADDER CATH COMPLEX: CPT

## 2023-02-08 PROCEDURE — 59025 FETAL NON-STRESS TEST: CPT

## 2023-02-08 RX ORDER — IBUPROFEN 600 MG/1
600 TABLET ORAL EVERY 6 HOURS PRN
Status: DISCONTINUED | OUTPATIENT
Start: 2023-02-08 | End: 2023-02-09 | Stop reason: HOSPADM

## 2023-02-08 RX ORDER — PROMETHAZINE HYDROCHLORIDE 12.5 MG/1
12.5 TABLET ORAL EVERY 4 HOURS PRN
Status: DISCONTINUED | OUTPATIENT
Start: 2023-02-08 | End: 2023-02-09 | Stop reason: HOSPADM

## 2023-02-08 RX ORDER — MISOPROSTOL 200 UG/1
800 TABLET ORAL AS NEEDED
Status: DISCONTINUED | OUTPATIENT
Start: 2023-02-08 | End: 2023-02-09 | Stop reason: HOSPADM

## 2023-02-08 RX ORDER — ACETAMINOPHEN 325 MG/1
650 TABLET ORAL EVERY 6 HOURS PRN
Status: DISCONTINUED | OUTPATIENT
Start: 2023-02-08 | End: 2023-02-09 | Stop reason: HOSPADM

## 2023-02-08 RX ORDER — HYDROCORTISONE 25 MG/G
1 CREAM TOPICAL AS NEEDED
Status: DISCONTINUED | OUTPATIENT
Start: 2023-02-08 | End: 2023-02-09 | Stop reason: HOSPADM

## 2023-02-08 RX ORDER — FERROUS SULFATE 325(65) MG
325 TABLET ORAL
Status: DISCONTINUED | OUTPATIENT
Start: 2023-02-08 | End: 2023-02-09 | Stop reason: HOSPADM

## 2023-02-08 RX ORDER — BISACODYL 10 MG
10 SUPPOSITORY, RECTAL RECTAL DAILY PRN
Status: DISCONTINUED | OUTPATIENT
Start: 2023-02-08 | End: 2023-02-09 | Stop reason: HOSPADM

## 2023-02-08 RX ORDER — ONDANSETRON 4 MG/1
4 TABLET, FILM COATED ORAL EVERY 6 HOURS PRN
Status: DISCONTINUED | OUTPATIENT
Start: 2023-02-08 | End: 2023-02-09 | Stop reason: HOSPADM

## 2023-02-08 RX ORDER — HYDROCODONE BITARTRATE AND ACETAMINOPHEN 10; 325 MG/1; MG/1
1 TABLET ORAL EVERY 4 HOURS PRN
Status: DISCONTINUED | OUTPATIENT
Start: 2023-02-08 | End: 2023-02-09 | Stop reason: HOSPADM

## 2023-02-08 RX ORDER — SODIUM CHLORIDE 0.9 % (FLUSH) 0.9 %
1-10 SYRINGE (ML) INJECTION AS NEEDED
Status: DISCONTINUED | OUTPATIENT
Start: 2023-02-08 | End: 2023-02-09 | Stop reason: HOSPADM

## 2023-02-08 RX ORDER — ONDANSETRON 2 MG/ML
4 INJECTION INTRAMUSCULAR; INTRAVENOUS EVERY 6 HOURS PRN
Status: DISCONTINUED | OUTPATIENT
Start: 2023-02-08 | End: 2023-02-09 | Stop reason: HOSPADM

## 2023-02-08 RX ORDER — HYDROCODONE BITARTRATE AND ACETAMINOPHEN 5; 325 MG/1; MG/1
1 TABLET ORAL EVERY 4 HOURS PRN
Status: DISCONTINUED | OUTPATIENT
Start: 2023-02-08 | End: 2023-02-09 | Stop reason: HOSPADM

## 2023-02-08 RX ORDER — DOCUSATE SODIUM 100 MG/1
100 CAPSULE, LIQUID FILLED ORAL 2 TIMES DAILY
Status: DISCONTINUED | OUTPATIENT
Start: 2023-02-08 | End: 2023-02-09 | Stop reason: HOSPADM

## 2023-02-08 RX ADMIN — IBUPROFEN 600 MG: 600 TABLET ORAL at 02:37

## 2023-02-08 RX ADMIN — IBUPROFEN 600 MG: 600 TABLET ORAL at 08:28

## 2023-02-08 RX ADMIN — Medication 1 APPLICATION: at 19:13

## 2023-02-08 RX ADMIN — Medication 325 MG: at 10:27

## 2023-02-08 RX ADMIN — WITCH HAZEL 1 PAD: 500 SOLUTION RECTAL; TOPICAL at 02:36

## 2023-02-08 RX ADMIN — DOCUSATE SODIUM 100 MG: 100 CAPSULE, LIQUID FILLED ORAL at 08:29

## 2023-02-08 RX ADMIN — Medication: at 02:36

## 2023-02-08 RX ADMIN — IBUPROFEN 600 MG: 600 TABLET ORAL at 14:48

## 2023-02-08 RX ADMIN — IBUPROFEN 600 MG: 600 TABLET ORAL at 20:19

## 2023-02-08 NOTE — ANESTHESIA PREPROCEDURE EVALUATION
Anesthesia Evaluation     Patient summary reviewed and Nursing notes reviewed   NPO Solid Status: > 6 hours  NPO Liquid Status: < 2 hours           Airway   Mallampati: II  TM distance: >3 FB  Neck ROM: full  No difficulty expected  Dental      Pulmonary - negative pulmonary ROS   Cardiovascular - negative cardio ROS        Neuro/Psych- negative ROS  GI/Hepatic/Renal/Endo    (+) morbid obesity,      Musculoskeletal (-) negative ROS    Abdominal    Substance History - negative use     OB/GYN    (+) Pregnant,         Other                        Anesthesia Plan    ASA 3     epidural       Anesthetic plan, risks, benefits, and alternatives have been provided, discussed and informed consent has been obtained with: patient.    Use of blood products discussed with patient .       CODE STATUS:

## 2023-02-08 NOTE — L&D DELIVERY NOTE
2023    Patient:Vaishali Ramirez    MR#:[id number]    Vaginal Delivery Note  22 y.o. yo female  at 39w0d    Patient Active Problem List   Diagnosis   • Morbid obesity with BMI of 40.0-44.9, adult ([facility])   • Pregnancy   • Term pregnancy       Delivery     Delivery: Vaginal, Spontaneous     YOB: 2023    Time of Birth: 11:14 PM      Anesthesia: Epidural     Delivering clinician: Pat Gan    Forceps?   No   Vacuum? No    Shoulder dystocia present: No      Pt progressed to complete, pushed with 1 contraction and delivered without dificulty. Baby vigiorus and crying.     Infant    Findings: male  infant     Infant observations: Weight: 3470 g (7 lb 10.4 oz)     Observations/Comments:        Apgars:   @ 1 minute /      @ 5 minutes         Placenta, Cord, and Fluid    Placenta delivered  Spontaneous  at  2023 11:17 PM     Cord: 3 vessels  present.   Nuchal Cord?  no   Cord blood obtained: Yes    Cord gases obtained:  No    Cord gas results: Pending         Repair    Episiotomy: No   Lacerations: Yes  Laceration Information  Laceration Repaired?   Perineal: None      Periurethral:   Yes    Labial:       Sulcus:       Vaginal:       Cervical:         Suture used for repair: 3-0 Vicryl   Estimated Blood Loss:   200mls.         Complications  none    Disposition  Mother to Mother Baby/Postpartum  in stable condition currently.  Baby to remains with mom  in stable condition currently.                Pat Gan MD  23  23:26 EST

## 2023-02-08 NOTE — LACTATION NOTE
02/08/23 1020   Maternal Information   Date of Referral 02/08/23   Person Making Referral lactation consultant  (new mother/baby couplet)   Maternal Assessment   Breast Size Issue none   Breast Shape Bilateral:;round   Breast Density Bilateral:;soft   Nipples Bilateral:;everted   Left Nipple Symptoms intact;nontender   Right Nipple Symptoms intact;nontender   Maternal Infant Feeding   Maternal Emotional State relaxed;receptive   Infant Positioning clutch/football   Signs of Milk Transfer deep jaw excursions noted   Pain with Feeding no   Comfort Measures Before/During Feeding infant position adjusted;maternal position adjusted   Latch Assistance minimal assistance   Support Person Involvement actively supporting mother   Milk Expression/Equipment   Breast Pump Type other (see comments)  (Mom thinks OB office may have ordered her a pump through [employer].  She will try to cancel so she can get a hospital now at the hospital.)     Baby latches and breast feeds well.  Mom was advised to attempt breastfeeding every 3 hours and on demand, and to ask for assistance, if needed.  She was provided basic breastfeeding education verbally, written, and a video link to  101.

## 2023-02-08 NOTE — ANESTHESIA POSTPROCEDURE EVALUATION
Patient: Vaishali Ramirez    Procedure Summary     Date: 02/07/23 Room / Location:     Anesthesia Start: 1957 Anesthesia Stop: 2317    Procedure: LABOR ANALGESIA Diagnosis:     Scheduled Providers:  Provider: Doyle West DO    Anesthesia Type: epidural ASA Status: 3          Anesthesia Type: epidural    Vitals  Vitals Value Taken Time   /60 02/08/23 0820   Temp 98.5 °F (36.9 °C) 02/08/23 0820   Pulse 88 02/08/23 0820   Resp 16 02/08/23 0820   SpO2             Post Anesthesia Care and Evaluation    Patient location during evaluation: bedside  Patient participation: complete - patient participated  Level of consciousness: awake and alert  Pain management: adequate    Airway patency: patent  Anesthetic complications: No anesthetic complications    Cardiovascular status: acceptable  Respiratory status: acceptable  Hydration status: acceptable  Post Neuraxial Block status: Motor and sensory function returned to baseline and No signs or symptoms of PDPH

## 2023-02-08 NOTE — PAYOR COMM NOTE
"Bharath Ramirez (22 y.o. Female) Initial notification and clinicals. Babs Banks RN      Date of Birth   2000    Social Security Number       Address   [address] [place] [address]    Home Phone   [phone]    MRN   [id number]       Holiness   Episcopalian    Marital Status   Single                            Admission Date   2/7/23    Admission Type   Elective    Admitting Provider   Sue Michel MD    Attending Provider   Sue Michel MD    Department, Room/Bed   [facility] BABY 4B, N436/1       Discharge Date       Discharge Disposition       Discharge Destination                               Attending Provider: Sue Michel MD    Allergies: No Known Allergies    Isolation: None   Infection: None   Code Status: CPR    Ht: 152.4 cm (60\")   Wt: 99.8 kg (220 lb)    Admission Cmt: None   Principal Problem: Term pregnancy [Z34.90]                 Active Insurance as of 2/7/2023     Primary Coverage     Payor Plan Insurance Group Employer/Plan Group    [employer] [place] [employer] [place]      Payor Plan Address Payor Plan Phone Number Payor Plan Fax Number Effective Dates    [address] [address] [phone]  12/28/2022 - None Entered    [facility] [id number]       Subscriber Name Subscriber Birth Date Member ID       MARQUISEBHARATH MAT 2000 [id number]                 Emergency Contacts      (Rel.) Home Phone Work Phone Mobile Phone    MAGGIE MUÑIZ (Mother) -- -- [phone]            Insurance Information                [facility]/[employer] MEDICAID Phone: [phone]    Subscriber: Bharath Ramirez Subscriber#: [id number]    Group#: -- Precert#: --          History & Physical    No notes of this type exist for this encounter.         Physician Progress Notes (last 24 hours)  Notes from 02/07/23 0056 through 02/08/23 0056   No notes of this type exist for this encounter.           Maternal Vitals (last day)     Date/Time Temp " Pulse Resp BP SpO2 Weight    02/08/23 0026 97.8 (36.6) 77 -- 111/53 -- --    02/08/23 0010 -- 83 -- 104/52 -- --    02/07/23 2357 -- 84 -- 109/62 -- --    02/07/23 2342 -- 86 -- 118/53 -- --    02/07/23 2327 97.1 (36.2) 90 16 125/73 -- --    02/07/23 2245 -- 87 -- 106/64 -- --    02/07/23 2231 -- 71 -- 102/58 -- --    02/07/23 2228 96.7 (35.9) -- -- 116/62 -- --    02/07/23 2223 -- 68 -- 111/57 -- --    02/07/23 2217 -- 70 -- 111/67 -- --    02/07/23 2214 -- 65 -- 108/63 -- --    02/07/23 2205 -- 64 -- 99/57 -- --    02/07/23 2149 -- 71 -- 101/59 -- --    02/07/23 2132 -- 78 -- 104/62 -- --    02/07/23 2129 -- 63 -- 103/59 -- --    02/07/23 2126 -- 75 -- 101/56 -- --    02/07/23 2124 -- 86 -- 109/67 -- --    02/07/23 2120 -- 83 -- 112/70 -- --    02/07/23 2114 -- 80 -- 105/61 -- --    02/07/23 2100 -- 96 -- 111/53 -- --    02/07/23 2046 -- 89 -- 108/57 -- --    02/07/23 2043 -- 107 -- 95/54 -- --    02/07/23 2040 -- 90 -- 122/59 -- --    02/07/23 2037 -- 97 -- 121/56 -- --    02/07/23 2031 -- 95 -- 106/56 -- --    02/07/23 2028 -- 100 -- 102/53 -- --    02/07/23 2025 -- 96 -- 127/71 -- --    02/07/23 2022 -- 89 -- 113/68 -- --    02/07/23 2019 -- 97 -- 112/66 -- --    02/07/23 2015 -- 115 -- 105/53 -- --    02/07/23 2013 -- 86 -- 114/66 -- --    02/07/23 2010 -- 83 -- 120/70 -- --    02/07/23 2008 -- 75 -- 112/69 -- --    02/07/23 1920 97.6 (36.4) 67 18 121/80 -- --    02/07/23 1844 98.9 (37.2) 64 16 118/74 -- --    02/07/23 1828 -- -- -- -- -- 99.8 (220)        FHR (last day)     Date/Time Fetal HR Assessment Method Fetal HR (beats/min) Fetal HR Baseline Fetal HR Variability Fetal HR Accelerations Fetal HR Decelerations Fetal HR Tracing Category    02/07/23 2015 external --  pt getting epidural indeterminate -- -- -- --    02/2000 external -- normal range moderate (amplitude range 6 to 25 bpm) greater than/equal to 15 bpm;lasting at least 15 seconds early --    02/07/23 1945 external 120 normal range moderate  (amplitude range 6 to 25 bpm) greater than/equal to 15 bpm;lasting at least 15 seconds early --    02/07/23 1930 external 120 normal range moderate (amplitude range 6 to 25 bpm) greater than/equal to 15 bpm;lasting at least 15 seconds early --    02/07/23 1915 external 120 normal range moderate (amplitude range 6 to 25 bpm) greater than/equal to 15 bpm;lasting at least 15 seconds absent --    02/07/23 1900 external 125 normal range moderate (amplitude range 6 to 25 bpm) greater than/equal to 15 bpm;lasting at least 15 seconds early --    02/07/23 1845 external 125 normal range moderate (amplitude range 6 to 25 bpm) greater than/equal to 15 bpm;lasting at least 15 seconds early --        Delivery      Delivery: Vaginal, Spontaneous     YOB: 2023    Time of Birth: 11:14 PM       Anesthesia: Epidural

## 2023-02-08 NOTE — PROGRESS NOTES
2/8/2023  PPD #1    Subjective   Vaishali feels well.  Patient describes her lochia less than menses.  Pain is well controlled       Objective   Temp: Temp:  [96.7 °F (35.9 °C)-98.9 °F (37.2 °C)] 98.5 °F (36.9 °C) Temp src: Oral   BP: BP: ()/(52-80) 116/60        Pulse: Heart Rate:  [] 88  RR: Resp:  [16-18] 16    General:  No acute distress   Abdomen: Fundus firm and beneath umbilicus   Pelvis: deferred     Lab Results   Component Value Date    WBC 11.79 (H) 02/08/2023    HGB 8.5 (L) 02/08/2023    HCT 28.2 (L) 02/08/2023    MCV 78.1 (L) 02/08/2023     02/08/2023    HEPBSAG Negative 08/24/2022     Infant: Male. Doing well. Desires circ.  Assessment  1. PPD# 1 after vaginal delivery. Doing well.  2. Anemic, Asymptomatic, VSS    Plan  1. Routine postpartum care. Advance.   2. PO iron with constipation precautions  3. AM H/H      This note has been electronically signed.    Eliza Horvath, APRN  February 8, 2023

## 2023-02-08 NOTE — ANESTHESIA PROCEDURE NOTES
Labor Epidural      Patient reassessed immediately prior to procedure    Patient location during procedure: OB  Performed By  Anesthesiologist: Doyle West DO  Preanesthetic Checklist  Completed: patient identified, IV checked, site marked, risks and benefits discussed, surgical consent, monitors and equipment checked, pre-op evaluation and timeout performed  Prep:  Pt Position:sitting  Sterile Tech:gloves, mask, sterile barrier and cap  Prep:chlorhexidine gluconate and isopropyl alcohol  Monitoring:blood pressure monitoring and continuous pulse oximetry  Epidural Block Procedure:  Approach:midline  Guidance:landmark technique and palpation technique  Location:L3-L4  Needle Type:Tuohy  Needle Gauge:17 G  Loss of Resistance Medium: air  Loss of Resistance: 7cm  Cath Depth at skin:12 cm  Paresthesia: none  Aspiration:negative  Test Dose:negative  Number of Attempts: 1  Post Assessment:  Dressing:secured with tape and occlusive dressing applied (Tegaderm Placed)  Pt Tolerance:patient tolerated the procedure well with no apparent complications  Complications:no

## 2023-02-09 VITALS
HEART RATE: 80 BPM | HEIGHT: 60 IN | DIASTOLIC BLOOD PRESSURE: 59 MMHG | WEIGHT: 220 LBS | RESPIRATION RATE: 16 BRPM | SYSTOLIC BLOOD PRESSURE: 107 MMHG | TEMPERATURE: 98 F | BODY MASS INDEX: 43.19 KG/M2

## 2023-02-09 LAB
HCT VFR BLD AUTO: 28.9 % (ref 34–46.6)
HGB BLD-MCNC: 8.8 G/DL (ref 12–15.9)

## 2023-02-09 PROCEDURE — 85018 HEMOGLOBIN: CPT

## 2023-02-09 PROCEDURE — 0503F POSTPARTUM CARE VISIT: CPT | Performed by: NURSE PRACTITIONER

## 2023-02-09 PROCEDURE — 85014 HEMATOCRIT: CPT

## 2023-02-09 RX ADMIN — IBUPROFEN 600 MG: 600 TABLET ORAL at 08:23

## 2023-02-09 RX ADMIN — Medication 325 MG: at 08:23

## 2023-02-09 RX ADMIN — DOCUSATE SODIUM 100 MG: 100 CAPSULE, LIQUID FILLED ORAL at 08:23

## 2023-02-09 RX ADMIN — IBUPROFEN 600 MG: 600 TABLET ORAL at 02:44

## 2023-02-09 NOTE — PAYOR COMM NOTE
"Bharath Camarillo (22 y.o. Female)     Auth#[id number]    Discharged 23 with Baby.    From:Rocío Umanzor LPN, Utilization Review  Phone #[phone]  Fax #[phone]      Date of Birth   2000    Social Security Number       Address   [address] [place] [address]    Home Phone   [phone]    MRN   [id number]       Catholic   Oriental orthodox    Marital Status   Single                            Admission Date   23    Admission Type   Elective    Admitting Provider   Sue Michel MD    Attending Provider       Department, Room/Bed   [facility] MOTHER BABY 4B, N436/1       Discharge Date   2023    Discharge Disposition   Home or Self Care    Discharge Destination                               Attending Provider: (none)   Allergies: No Known Allergies    Isolation: None   Infection: None   Code Status: Prior    Ht: 152.4 cm (60\")   Wt: 99.8 kg (220 lb)    Admission Cmt: None   Principal Problem: Term pregnancy [Z34.90]                 Active Insurance as of 2023     Primary Coverage     Payor Plan Insurance Group Employer/Plan Group    [employer] [place] [employer] [place]ICA[place]      Payor Plan Address Payor Plan Phone Number Payor Plan Fax Number Effective Dates    [address] [address] [phone]  2022 - None Entered    [facility] [id number]       Subscriber Name Subscriber Birth Date Member ID       BHARATH CAMARILLO 2000 [id number]                 Emergency Contacts      (Rel.) Home Phone Work Phone Mobile Phone    MAGGIE MUÑIZ (Mother) -- -- [phone]               Operative/Procedure Notes (last 7 days)      Pat Gan MD at 23 2326            2023    Patient:Bharath Camarillo    MR#:[id number]    Vaginal Delivery Note  22 y.o. yo female  at 39w0d    Patient Active Problem List   Diagnosis   • Morbid obesity with BMI of 40.0-44.9, adult (HCC)   • Pregnancy   • Term pregnancy       Delivery     Delivery: " Vaginal, Spontaneous     YOB: 2023    Time of Birth: 11:14 PM      Anesthesia: Epidural     Delivering clinician: Pat Gan    Forceps?   No   Vacuum? No    Shoulder dystocia present: No      Pt progressed to complete, pushed with 1 contraction and delivered without dificulty. Baby vigiorus and crying.     Infant    Findings: male  infant     Infant observations: Weight: 3470 g (7 lb 10.4 oz)     Observations/Comments:        Apgars:   @ 1 minute /      @ 5 minutes         Placenta, Cord, and Fluid    Placenta delivered  Spontaneous  at  2/7/2023 11:17 PM     Cord: 3 vessels  present.   Nuchal Cord?  no   Cord blood obtained: Yes    Cord gases obtained:  No    Cord gas results: Pending         Repair    Episiotomy: No   Lacerations: Yes  Laceration Information  Laceration Repaired?   Perineal: None      Periurethral:   Yes    Labial:       Sulcus:       Vaginal:       Cervical:         Suture used for repair: 3-0 Vicryl   Estimated Blood Loss:   200mls.         Complications  none    Disposition  Mother to Mother Baby/Postpartum  in stable condition currently.  Baby to remains with mom  in stable condition currently.                Pat Gan MD  02/07/23  23:26 EST            Electronically signed by Pat Gan MD at 02/07/23 2327          Discharge Summary      Niurka Cook APRN at 02/09/23 0904          Discharge Summary    Date of Admission: 2/7/2023  Date of Discharge:  2/9/2023      Patient: Vaishali Ramirez      MR#:[id number]    Delivery Provider: Pat Gan     Attending Provider: Sue Michel MD    Presenting Problem/History of Present Illness  Term pregnancy [Z34.90]     Patient Active Problem List   Diagnosis   • Morbid obesity with BMI of 40.0-44.9, adult ([facility])   • Pregnancy   • Term pregnancy         Discharge Diagnosis: Vaginal delivery at 39w0d    Procedures:  Vaginal, Spontaneous     2/7/2023    11:14 PM        Discharge Date: 2/9/2023;     [facility]  Course  Patient is a 22 y.o. female  at 39w0d status post vaginal delivery without complication. Postpartum the patient did well. She remained afebrile, with vital signs stable. She was ready for discharge on postpartum day 2.     Infant:   male  fetus 3470 g (7 lb 10.4 oz)  with Apgar scores of 7 , 9  at five minutes.    Condition on Discharge:  Stable    Vital Signs  Temp:  [98 °F (36.7 °C)-98.7 °F (37.1 °C)] 98 °F (36.7 °C)  Heart Rate:  [76-80] 80  Resp:  [16-18] 16  BP: (107-123)/(57-71) 107/59    Lab Results   Component Value Date    WBC 11.79 (H) 2023    HGB 8.8 (L) 2023    HCT 28.9 (L) 2023    MCV 78.1 (L) 2023     2023       Discharge Disposition  Home or Self Care    Discharge Medications     Discharge Medications      Continue These Medications      Instructions Start Date   FeroSul 325 (65 FE) MG tablet  Generic drug: ferrous sulfate   1 tablet, Oral, As Needed, Patient reports taking PRN.             Discharge Diet:     Activity at Discharge:   Activity Instructions     Sexual Activity Restrictions      Type of Restriction: Sex    Explain Sexual Activity Restrictions: Pelvic rest for 6 weeks    Work Restrictions      Type of Restriction: Work    May Return to Work: After Next Appointment    With / Without Restrictions: Without Restrictions          Follow-up Appointments  Future Appointments   Date Time Provider Department Center   2023 10:15 AM Sue Michel MD [facility]  [facility]   2023  6:00 AM [facility] LD INDUCTION ROOM 1 [facility]     Additional Instructions for the Follow-ups that You Need to Schedule     Call MD With Problems / Concerns   As directed      Instructions: Call for temp > 100.3, severe pain, severe bleeding, headache that does not improve with medication, blurred vision, or postpartum depression.    Order Comments: Instructions: Call for temp > 100.3, severe pain, severe bleeding, headache that does not improve with medication,  blurred vision, or postpartum depression.          Discharge Follow-up with Specified Provider: Dr. Michel or an DUNCAN; 6 Weeks   As directed      To: Dr. Michel or an DUNCAN    Follow Up: 6 Weeks               DUNCAN Rosen  02/09/23  09:04 EST  Csd    Electronically signed by Niurka Cook APRN at 02/09/23 0904

## 2023-02-09 NOTE — DISCHARGE SUMMARY
Discharge Summary    Date of Admission: 2023  Date of Discharge:  2023      Patient: Vaishali Ramirez      MR#:[id number]    Delivery Provider: Pat Gan     Attending Provider: Sue Michel MD    Presenting Problem/History of Present Illness  Term pregnancy [Z34.90]     Patient Active Problem List   Diagnosis   • Morbid obesity with BMI of 40.0-44.9, adult (HCC)   • Pregnancy   • Term pregnancy         Discharge Diagnosis: Vaginal delivery at 39w0d    Procedures:  Vaginal, Spontaneous     2023    11:14 PM        Discharge Date: 2023;     Hospital Course  Patient is a 22 y.o. female  at 39w0d status post vaginal delivery without complication. Postpartum the patient did well. She remained afebrile, with vital signs stable. She was ready for discharge on postpartum day 2.     Infant:   male  fetus 3470 g (7 lb 10.4 oz)  with Apgar scores of 7 , 9  at five minutes.    Condition on Discharge:  Stable    Vital Signs  Temp:  [98 °F (36.7 °C)-98.7 °F (37.1 °C)] 98 °F (36.7 °C)  Heart Rate:  [76-80] 80  Resp:  [16-18] 16  BP: (107-123)/(57-71) 107/59    Lab Results   Component Value Date    WBC 11.79 (H) 2023    HGB 8.8 (L) 2023    HCT 28.9 (L) 2023    MCV 78.1 (L) 2023     2023       Discharge Disposition  Home or Self Care    Discharge Medications     Discharge Medications      Continue These Medications      Instructions Start Date   FeroSul 325 (65 FE) MG tablet  Generic drug: ferrous sulfate   1 tablet, Oral, As Needed, Patient reports taking PRN.             Discharge Diet:     Activity at Discharge:   Activity Instructions     Sexual Activity Restrictions      Type of Restriction: Sex    Explain Sexual Activity Restrictions: Pelvic rest for 6 weeks    Work Restrictions      Type of Restriction: Work    May Return to Work: After Next Appointment    With / Without Restrictions: Without Restrictions          Follow-up Appointments  Future Appointments    Date Time Provider Department Center   2/9/2023 10:15 AM Sue Michel MD [facility]  [facility]   2/14/2023  6:00 AM [facility] LD INDUCTION ROOM 1 [facility]     Additional Instructions for the Follow-ups that You Need to Schedule     Call MD With Problems / Concerns   As directed      Instructions: Call for temp > 100.3, severe pain, severe bleeding, headache that does not improve with medication, blurred vision, or postpartum depression.    Order Comments: Instructions: Call for temp > 100.3, severe pain, severe bleeding, headache that does not improve with medication, blurred vision, or postpartum depression.          Discharge Follow-up with Specified Provider: Dr. Michel or franklin SONG; 6 Weeks   As directed      To: Dr. Michel or franklin SONG    Follow Up: 6 Weeks               Niurka Cook, DUNCAN  02/09/23  09:04 EST  Csd

## 2023-02-10 NOTE — H&P
"History and Physical:    Subjective     No chief complaint on file.      Vaishali Ramirez is a 22 y.o. year old  with an Estimated Date of Delivery: 23 currently at 39w0d presenting with regular contractions.    Prenatal care has been with No att. providers found.    Review of Systems  Pertinent items are noted in HPI.     History reviewed. No pertinent past medical history.  Past Surgical History:   Procedure Laterality Date   • TONSILLECTOMY AND ADENOIDECTOMY       Family History   Problem Relation Age of Onset   • Breast cancer Neg Hx    • Colon cancer Neg Hx    • Ovarian cancer Neg Hx    • Uterine cancer Neg Hx      Social History     Tobacco Use   • Smoking status: Former     Types: Cigarettes     Quit date: 2021     Years since quittin.9   • Smokeless tobacco: Never   Vaping Use   • Vaping Use: Never used   Substance Use Topics   • Alcohol use: Not Currently   • Drug use: Never     No medications prior to admission.     Allergies:  Patient has no known allergies.  OB History    Para Term  AB Living   2 2 2 0 0 2   SAB IAB Ectopic Molar Multiple Live Births   0 0 0 0 0 2      # Outcome Date GA Lbr Jabari/2nd Weight Sex Delivery Anes PTL Lv   2 Term 23 39w0d 06:23 3470 g (7 lb 10.4 oz) M Vag-Spont EPI N MICHELLE   1 Term 22 40w1d  3685 g (8 lb 2 oz) M Vag-Spont   MICHELLE         Objective     /59   Pulse 80   Temp 98 °F (36.7 °C) (Oral)   Resp 16   Ht 152.4 cm (60\")   Wt 99.8 kg (220 lb)   Breastfeeding Yes   BMI 42.97 kg/m²     Physical Exam    General:  No acute distress           Abdomen: Gravid, nontender       FHT's: reactive    Cervix:    Assaria:      Lab Review   External Prenatal Results     Pregnancy Outside Results - Transcribed From Office Records - See Scanned Records For Details     Test Value Date Time    ABO  AB  23    Rh  Positive  23    Antibody Screen  Negative  23 1842      ^ Normal  22     Varicella IgG       " Rubella ^ Immune  08/24/22     Hgb  8.8 g/dL 02/09/23 0546       8.5 g/dL 02/08/23 0829       10.2 g/dL 02/07/23 1842      ^ 10.2 g/dL 11/02/22       ^ 11.5 g/dL 08/24/22     Hct  28.9 % 02/09/23 0546       28.2 % 02/08/23 0829       32.2 % 02/07/23 1842      ^ 31 % 11/02/22       ^ 34 % 08/24/22     Glucose Fasting GTT       Glucose Tolerance Test 1 hour ^ 131  11/02/22     Glucose Tolerance Test 3 hour       Gonorrhea (discrete)  Negative  01/10/23       ^ Negative  08/24/22     Chlamydia (discrete)  Negative  01/10/23       ^ Negative  08/24/22     RPR ^ Non-Reactive  08/24/22     VDRL       Syphilis Antibody       HBsAg ^ Negative  08/24/22     Herpes Simplex Virus PCR       Herpes Simplex VIrus Culture       HIV ^ Negative  08/24/22     Hep C RNA Quant PCR       Hep C Antibody ^ Negative  09/21/22       ^ Reactive  08/24/22     AFP       Group B Strep  No Group B Streptococcus isolated  01/17/23 1806    GBS Susceptibility to Clindamycin       GBS Susceptibility to Erythromycin       Fetal Fibronectin       Genetic Testing, Maternal Blood             Drug Screening     Test Value Date Time    Urine Drug Screen       Amphetamine Screen ^ Negative  08/24/22     Barbiturate Screen ^ Negative  08/24/22     Benzodiazepine Screen ^ Negative  08/24/22     Methadone Screen ^ Negative  08/24/22     Phencyclidine Screen       Opiates Screen ^ Negative  08/24/22     THC Screen ^ Positive  08/24/22     Cocaine Screen ^ Negative  08/24/22     Propoxyphene Screen ^ Negative  08/24/22     Buprenorphine Screen ^ Negative  08/24/22     Methamphetamine Screen       Oxycodone Screen ^ Negative  08/24/22     Tricyclic Antidepressants Screen             Legend    ^: Historical                            Assessment & Plan     2  1. IUP at 39w0d  2. labor   3. GBBSnegative  PLAN  1. Admit to labor and delivery   2.  expect vaginal delivery         Pat Gan MD  2/10/2023@

## 2023-02-13 ENCOUNTER — TELEPHONE (OUTPATIENT)
Dept: OBSTETRICS AND GYNECOLOGY | Facility: CLINIC | Age: 23
End: 2023-02-13
Payer: COMMERCIAL

## 2023-02-13 ENCOUNTER — POSTPARTUM VISIT (OUTPATIENT)
Dept: OBSTETRICS AND GYNECOLOGY | Facility: CLINIC | Age: 23
End: 2023-02-13
Payer: COMMERCIAL

## 2023-02-13 VITALS — SYSTOLIC BLOOD PRESSURE: 118 MMHG | DIASTOLIC BLOOD PRESSURE: 74 MMHG | WEIGHT: 208.4 LBS | BODY MASS INDEX: 40.7 KG/M2

## 2023-02-13 DIAGNOSIS — N39.498 OTHER URINARY INCONTINENCE: Primary | ICD-10-CM

## 2023-02-13 DIAGNOSIS — N93.9 VAGINAL BLEEDING: ICD-10-CM

## 2023-02-13 LAB
BILIRUB BLD-MCNC: NEGATIVE MG/DL
GLUCOSE UR STRIP-MCNC: NEGATIVE MG/DL
KETONES UR QL: NEGATIVE
LEUKOCYTE EST, POC: NEGATIVE
NITRITE UR-MCNC: NEGATIVE MG/ML
PH UR: 7 [PH] (ref 5–8)
PROT UR STRIP-MCNC: NEGATIVE MG/DL
RBC # UR STRIP: ABNORMAL /UL
SP GR UR: 1.01 (ref 1–1.03)
UROBILINOGEN UR QL: ABNORMAL

## 2023-02-13 PROCEDURE — 0503F POSTPARTUM CARE VISIT: CPT

## 2023-02-13 RX ORDER — MISOPROSTOL 200 UG/1
TABLET ORAL
Qty: 4 TABLET | Refills: 0 | Status: SHIPPED | OUTPATIENT
Start: 2023-02-13 | End: 2023-02-14

## 2023-02-13 NOTE — TELEPHONE ENCOUNTER
"Dr. Gan PP pt.   Vaginal delivery 2/7/23    S/w pt she states since she was discharged from the hospital on 2/10 she has been having vaginal blood clots that are golf ball in size or bigger at times with pelvic pain as well. Patient states she is using about 6 diapers per day and noticed a vaginal odor after using the restroom that smells like \"rotted meat\" and has been urinating on herself as well on the diapers. Patient states she has been taking ibuprofen 800mg every 5-6 hours but it has not helped at all. Patient states she does have some body aches as well.     Patient denies: fever    I advised patient to come into the office for U/S and see NP afterwards for further evaluation and to leave a CCUA upon arrival. She v/u     appt scheduled, U/S order in.  "

## 2023-02-13 NOTE — PROGRESS NOTES
"      Chief Complaint   Patient presents with   • Postpartum Care     AUB       Postpartum problems visit       Vaishali Ramirez is a 23 y.o.  who is s/p Vaginal, Spontaneous    Information for the patient's :  Sedrick Ramirez [[id number]]   2023   male   Sedrick Ramirez   3470 g (7 lb 10.4 oz)   Gestational Age: 39w0d      Baby Discharged: Discharged with Mom  Delivering Physician: Pat Gan MD    She presents today for bleeding, large clots, foul smelling vaginal odor.    The patient complains of moderate amount bleeding but she is passing clots with every pad change about every 2-3 hours, she reports left sided abdominal pain that radiates to her lower back, she reports she had a vaginal odor like \"rotten meat\".  She reports for 4 days after delivery she was constipated, after she was finally able to have BM she started having the clotting and pain. She is taking Ibuprofen for pain.     Patient describes bleeding as moderate.  Patient is breast feeding.  denies bowel or bladder issues.    Patient denies postpartum depression. Postpartum Depression Screening Questionnaire: 8, no treatment indicated.    The additional following portions of the patient's history were reviewed and updated as appropriate: allergies and current medications.      Review of Systems   Genitourinary: Positive for vaginal bleeding ( with large clots). Negative for difficulty urinating and vaginal pain.   All other systems reviewed and are negative.      I have reviewed and agree with the HPI, ROS, and historical information as entered above. Enma Garcia, APRN    Objective   /74   Wt 94.5 kg (208 lb 6.4 oz)   Breastfeeding Yes   BMI 40.70 kg/m²     Physical Exam  Vitals and nursing note reviewed. Exam conducted with a chaperone present.   Constitutional:       General: She is not in acute distress.     Appearance: She is not ill-appearing, toxic-appearing or diaphoretic.   Pulmonary:      " Effort: Pulmonary effort is normal. No respiratory distress.   Abdominal:      General: There is no distension.      Palpations: Abdomen is soft.   Genitourinary:     General: Normal vulva.      Exam position: Lithotomy position.      Vagina: Bleeding present.      Comments: Cervix close and fundus firm per Dr. Rubio.  Neurological:      Mental Status: She is alert and oriented to person, place, and time.   Psychiatric:         Mood and Affect: Mood normal.         Behavior: Behavior normal.          Assessment and Plan    Problem List Items Addressed This Visit    None  Visit Diagnoses     Other urinary incontinence    -  Primary    Relevant Orders    POC Urinalysis Dipstick (Completed)    Vaginal bleeding        Relevant Medications    miSOPROStol (Cytotec) 200 MCG tablet    Other Relevant Orders    CBC & Differential    Postpartum hemorrhage, unspecified type              1. S/p Vaginal delivery, 2 weeks postpartum.  Doing well.    2. Continued pelvic rest.    3. UA dip negative except +3 blood  4. Discussed changes in postpartum mood-anxiety, depression, sadness to call office for evaluation as soon as symptoms arise even if before 6 week appointment. Monitor for excessive bleeding >1 pad/hr for several hours, dizziness, syncope, fever or changes in blood pressure.  5. Contraception: contraceptive methods: Abstinence   6. Transvaginal ultrasound 6 days pp. Heterogeneous with subtle lobulated areas likely representing clot however rpoc can not be excluded. Endometrial thickness, total 17.6mm Cervix normal-Dr. Rubio reviewed.  7. Pelvic evaluation by Dr. Rubio  Return in about 1 week (around 2/20/2023) for FOR PP VISIT with Dr. Michel or Elvia.    Enma Garcia, APRN  02/13/2023

## 2023-02-13 NOTE — TELEPHONE ENCOUNTER
PT had vaginal delivery on 2/7 and is passing large blood clots and complaining of being in pain.

## 2023-02-14 ENCOUNTER — TELEPHONE (OUTPATIENT)
Dept: OBSTETRICS AND GYNECOLOGY | Facility: CLINIC | Age: 23
End: 2023-02-14
Payer: COMMERCIAL

## 2023-02-14 DIAGNOSIS — N93.9 VAGINAL BLEEDING: ICD-10-CM

## 2023-02-14 LAB
BASOPHILS # BLD AUTO: 0.04 10*3/MM3 (ref 0–0.2)
BASOPHILS NFR BLD AUTO: 0.4 % (ref 0–1.5)
EOSINOPHIL # BLD AUTO: 0.31 10*3/MM3 (ref 0–0.4)
EOSINOPHIL NFR BLD AUTO: 3.4 % (ref 0.3–6.2)
ERYTHROCYTE [DISTWIDTH] IN BLOOD BY AUTOMATED COUNT: 17.1 % (ref 12.3–15.4)
HCT VFR BLD AUTO: 33.3 % (ref 34–46.6)
HGB BLD-MCNC: 10 G/DL (ref 12–15.9)
IMM GRANULOCYTES # BLD AUTO: 0.08 10*3/MM3 (ref 0–0.05)
IMM GRANULOCYTES NFR BLD AUTO: 0.9 % (ref 0–0.5)
LYMPHOCYTES # BLD AUTO: 2.4 10*3/MM3 (ref 0.7–3.1)
LYMPHOCYTES NFR BLD AUTO: 25.9 % (ref 19.6–45.3)
MCH RBC QN AUTO: 23.3 PG (ref 26.6–33)
MCHC RBC AUTO-ENTMCNC: 30 G/DL (ref 31.5–35.7)
MCV RBC AUTO: 77.6 FL (ref 79–97)
MONOCYTES # BLD AUTO: 0.6 10*3/MM3 (ref 0.1–0.9)
MONOCYTES NFR BLD AUTO: 6.5 % (ref 5–12)
NEUTROPHILS # BLD AUTO: 5.82 10*3/MM3 (ref 1.7–7)
NEUTROPHILS NFR BLD AUTO: 62.9 % (ref 42.7–76)
NRBC BLD AUTO-RTO: 0 /100 WBC (ref 0–0.2)
PLATELET # BLD AUTO: 298 10*3/MM3 (ref 140–450)
RBC # BLD AUTO: 4.29 10*6/MM3 (ref 3.77–5.28)
WBC # BLD AUTO: 9.25 10*3/MM3 (ref 3.4–10.8)

## 2023-02-14 RX ORDER — MISOPROSTOL 200 UG/1
TABLET ORAL
Qty: 4 TABLET | Refills: 0 | Status: SHIPPED | OUTPATIENT
Start: 2023-02-14 | End: 2023-03-21

## 2023-02-14 NOTE — TELEPHONE ENCOUNTER
Pt called and said her prescription was called into the wrong pharmacy yesterday. She said it needs to go to [facility] on [address]

## 2023-02-14 NOTE — TELEPHONE ENCOUNTER
Patient was seen in office yesterday. States her prescription was sent to wrong pharmacy. Verified with patient. Medication sent to [facility] [address]. Patient v/u.

## 2023-03-21 ENCOUNTER — POSTPARTUM VISIT (OUTPATIENT)
Dept: OBSTETRICS AND GYNECOLOGY | Facility: CLINIC | Age: 23
End: 2023-03-21
Payer: COMMERCIAL

## 2023-03-21 VITALS
BODY MASS INDEX: 40.72 KG/M2 | SYSTOLIC BLOOD PRESSURE: 110 MMHG | HEIGHT: 60 IN | WEIGHT: 207.4 LBS | DIASTOLIC BLOOD PRESSURE: 72 MMHG

## 2023-03-21 DIAGNOSIS — Z12.4 PAP SMEAR FOR CERVICAL CANCER SCREENING: Primary | ICD-10-CM

## 2023-03-21 PROCEDURE — 0503F POSTPARTUM CARE VISIT: CPT | Performed by: OBSTETRICS & GYNECOLOGY

## 2023-03-21 NOTE — PROGRESS NOTES
"        Chief Complaint   Patient presents with   • Postpartum Care     6 wk PP       6 Week Postpartum Visit         Vaishali Ramirez is a 23 y.o.  who presents today for a 6 week postpartum check.       Vaginal, Spontaneous    Information for the patient's :  Jerzy Ramirez [[id number]]   2023   male   Jerzy Ramirez   3470 g (7 lb 10.4 oz)   Gestational Age: 39w0d          Baby Discharged: Discharged with Mom  Delivering Physician: Pat Gan MD    At the time of delivery were you diagnosed with any of the following: None. The laceration was periurethral degree and is healing well. Patient describes vaginal bleeding as light.  Patient is pumping and bottle feeding. She desires contraceptive methods: None for contraception.  Patient reports bowel or bladder issues. Pt reports hemorrhoids unrelieved with hemorrhoid cream.     Pt was last seen on 23 by DUNCAN Hu and reported moderate amount bleeding, she was passing clots with every pad change about every 2-3 hours, she reported left sided abdominal pain that radiated to her lower back, she reported she had a vaginal odor like \"rotten meat\".  She reported for 4 days after delivery she was constipated, after she was finally able to have BM she started having the clotting and pain.     Patient reports postpartum depression. Postpartum Depression Screening Questionnaire: 8. Patient reports she has good support system at home. Pt states it is more anxiety related. Denies SI/HI.       Last Pap : per pt last one in 2016   Last Completed Pap Smear     This patient has no relevant Health Maintenance data.            The additional following portions of the patient's history were reviewed and updated as appropriate: allergies and current medications.    Review of Systems  All other systems reviewed and are negative.     I have reviewed and agree with the HPI, ROS, and historical information as entered above. Sue Michel, " "MD    /72   Ht 152.4 cm (60\")   Wt 94.1 kg (207 lb 6.4 oz)   LMP  (LMP Unknown)   Breastfeeding No   BMI 40.51 kg/m²     Physical Exam  Vitals and nursing note reviewed. Exam conducted with a chaperone present.   Constitutional:       Appearance: She is well-developed.   HENT:      Head: Normocephalic and atraumatic.   Neck:      Thyroid: No thyroid mass or thyromegaly.   Pulmonary:      Breath sounds: No rhonchi.   Abdominal:      Palpations: Abdomen is soft. Abdomen is not rigid. There is no mass.      Tenderness: There is no abdominal tenderness. There is no guarding.      Hernia: No hernia is present.   Genitourinary:     Vagina: Normal.      Cervix: Normal.      Uterus: Normal.    Musculoskeletal:      Cervical back: Normal range of motion. No muscular tenderness.   Neurological:      Mental Status: She is alert and oriented to person, place, and time.   Psychiatric:         Behavior: Behavior normal.             Assessment and Plan    Problem List Items Addressed This Visit    None  Visit Diagnoses     Pap smear for cervical cancer screening    -  Primary    Relevant Orders    LIQUID-BASED PAP SMEAR, [facility] ([facility])    Postpartum exam              1. S/p Vaginal delivery, 6 weeks postpartum.  Doing well.    2. Return to normal physical activity.  No pelvic restrictions.   3. Baby doing well.  4. Breastfeeding going well.  5. Bottlefeeding going well.   6. Discussed mood and she does not feel she needs to see therapist or add medication at this time.  She will let me know if any changes or wrosens.  7. Contraception: contraceptive methods: None  8. Return in about 1 year (around 3/21/2024), or if symptoms worsen or fail to improve.     Sue Michel MD  03/21/2023  "

## 2023-03-22 LAB — REF LAB TEST METHOD: NORMAL

## 2023-08-10 NOTE — TELEPHONE ENCOUNTER
Physical/Occupational Therapy  María Toribio  PT/OT treatment attempted. Pt currently receiving hemodialysis treatment upon arrival. PT/OT will follow-up with pt as schedule allows.   Thank you,  Becki Nichols, PT, DPT, [id number]  Cheng Zamudio, [address] [place] [address], OTR/ [id number] Spoke with Vaishali and the ctx have stopped. I explained that if they happen again and she thinks they are real just head to L/D. She vu. She jagruti vb or lof and has good fm

## 2023-12-07 ENCOUNTER — TRANSCRIBE ORDERS (OUTPATIENT)
Dept: LAB | Facility: HOSPITAL | Age: 23
End: 2023-12-07
Payer: COMMERCIAL

## 2023-12-07 ENCOUNTER — LAB (OUTPATIENT)
Dept: LAB | Facility: HOSPITAL | Age: 23
End: 2023-12-07
Payer: COMMERCIAL

## 2023-12-07 DIAGNOSIS — Z11.3 SCREENING EXAMINATION FOR VENEREAL DISEASE: ICD-10-CM

## 2023-12-07 DIAGNOSIS — Z11.3 SCREENING EXAMINATION FOR VENEREAL DISEASE: Primary | ICD-10-CM

## 2023-12-07 LAB
HBV SURFACE AG SERPL QL IA: NORMAL
HCV AB SER DONR QL: NORMAL
HIV 1+2 AB+HIV1 P24 AG SERPL QL IA: NORMAL

## 2023-12-07 PROCEDURE — 86780 TREPONEMA PALLIDUM: CPT

## 2023-12-07 PROCEDURE — 36415 COLL VENOUS BLD VENIPUNCTURE: CPT

## 2023-12-07 PROCEDURE — 86803 HEPATITIS C AB TEST: CPT

## 2023-12-07 PROCEDURE — 86695 HERPES SIMPLEX TYPE 1 TEST: CPT

## 2023-12-07 PROCEDURE — 86696 HERPES SIMPLEX TYPE 2 TEST: CPT

## 2023-12-07 PROCEDURE — G0432 EIA HIV-1/HIV-2 SCREEN: HCPCS

## 2023-12-07 PROCEDURE — 87340 HEPATITIS B SURFACE AG IA: CPT

## 2023-12-09 LAB
HSV1 IGG SER IA-ACNC: <0.91 INDEX (ref 0–0.9)
HSV2 IGG SER IA-ACNC: <0.91 INDEX (ref 0–0.9)

## 2023-12-11 LAB — TREPONEMA PALLIDUM IGG+IGM AB [PRESENCE] IN SERUM OR PLASMA BY IMMUNOASSAY: NON REACTIVE

## 2024-09-26 ENCOUNTER — LAB (OUTPATIENT)
Dept: LAB | Facility: HOSPITAL | Age: 24
End: 2024-09-26
Payer: COMMERCIAL

## 2024-09-26 ENCOUNTER — OFFICE VISIT (OUTPATIENT)
Dept: FAMILY MEDICINE CLINIC | Facility: CLINIC | Age: 24
End: 2024-09-26
Payer: COMMERCIAL

## 2024-09-26 VITALS
BODY MASS INDEX: 43.39 KG/M2 | DIASTOLIC BLOOD PRESSURE: 74 MMHG | SYSTOLIC BLOOD PRESSURE: 102 MMHG | HEIGHT: 60 IN | HEART RATE: 80 BPM | WEIGHT: 221 LBS | OXYGEN SATURATION: 98 % | TEMPERATURE: 97.8 F

## 2024-09-26 DIAGNOSIS — Z00.00 ANNUAL PHYSICAL EXAM: Primary | ICD-10-CM

## 2024-09-26 DIAGNOSIS — M62.89 PELVIC FLOOR DYSFUNCTION IN FEMALE: ICD-10-CM

## 2024-09-26 DIAGNOSIS — Z00.00 ANNUAL PHYSICAL EXAM: ICD-10-CM

## 2024-09-26 LAB
CHOLEST SERPL-MCNC: 164 MG/DL (ref 0–200)
HBA1C MFR BLD: 5.8 % (ref 4.8–5.6)
HDLC SERPL-MCNC: 31 MG/DL (ref 40–60)
LDLC SERPL CALC-MCNC: 112 MG/DL (ref 0–100)
LDLC/HDLC SERPL: 3.54 {RATIO}
TRIGL SERPL-MCNC: 116 MG/DL (ref 0–150)
VLDLC SERPL-MCNC: 21 MG/DL (ref 5–40)

## 2024-09-26 PROCEDURE — 2014F MENTAL STATUS ASSESS: CPT | Performed by: STUDENT IN AN ORGANIZED HEALTH CARE EDUCATION/TRAINING PROGRAM

## 2024-09-26 PROCEDURE — 83036 HEMOGLOBIN GLYCOSYLATED A1C: CPT

## 2024-09-26 PROCEDURE — 99385 PREV VISIT NEW AGE 18-39: CPT | Performed by: STUDENT IN AN ORGANIZED HEALTH CARE EDUCATION/TRAINING PROGRAM

## 2024-09-26 PROCEDURE — 90471 IMMUNIZATION ADMIN: CPT | Performed by: STUDENT IN AN ORGANIZED HEALTH CARE EDUCATION/TRAINING PROGRAM

## 2024-09-26 PROCEDURE — 90715 TDAP VACCINE 7 YRS/> IM: CPT | Performed by: STUDENT IN AN ORGANIZED HEALTH CARE EDUCATION/TRAINING PROGRAM

## 2024-09-26 PROCEDURE — 1159F MED LIST DOCD IN RCRD: CPT | Performed by: STUDENT IN AN ORGANIZED HEALTH CARE EDUCATION/TRAINING PROGRAM

## 2024-09-26 PROCEDURE — 1160F RVW MEDS BY RX/DR IN RCRD: CPT | Performed by: STUDENT IN AN ORGANIZED HEALTH CARE EDUCATION/TRAINING PROGRAM

## 2024-09-26 PROCEDURE — 1126F AMNT PAIN NOTED NONE PRSNT: CPT | Performed by: STUDENT IN AN ORGANIZED HEALTH CARE EDUCATION/TRAINING PROGRAM

## 2024-09-26 PROCEDURE — 80061 LIPID PANEL: CPT

## 2024-12-07 ENCOUNTER — OFFICE VISIT (OUTPATIENT)
Dept: FAMILY MEDICINE CLINIC | Facility: CLINIC | Age: 24
End: 2024-12-07
Payer: COMMERCIAL

## 2024-12-07 VITALS
BODY MASS INDEX: 43.66 KG/M2 | WEIGHT: 222.4 LBS | SYSTOLIC BLOOD PRESSURE: 112 MMHG | OXYGEN SATURATION: 99 % | HEIGHT: 60 IN | DIASTOLIC BLOOD PRESSURE: 68 MMHG | TEMPERATURE: 99.1 F | HEART RATE: 80 BPM

## 2024-12-07 DIAGNOSIS — M62.89 PELVIC FLOOR DYSFUNCTION IN FEMALE: ICD-10-CM

## 2024-12-07 DIAGNOSIS — M54.50 ACUTE RIGHT-SIDED LOW BACK PAIN WITHOUT SCIATICA: Primary | ICD-10-CM

## 2024-12-07 DIAGNOSIS — R30.9 PAINFUL URINATION: ICD-10-CM

## 2024-12-07 LAB
BILIRUB BLD-MCNC: NEGATIVE MG/DL
CLARITY, POC: CLEAR
COLOR UR: NORMAL
EXPIRATION DATE: NORMAL
GLUCOSE UR STRIP-MCNC: NEGATIVE MG/DL
KETONES UR QL: NEGATIVE
LEUKOCYTE EST, POC: NEGATIVE
Lab: NORMAL
NITRITE UR-MCNC: NEGATIVE MG/ML
PH UR: 6 [PH] (ref 5–8)
PROT UR STRIP-MCNC: NEGATIVE MG/DL
RBC # UR STRIP: NEGATIVE /UL
SP GR UR: 1.02 (ref 1–1.03)
UROBILINOGEN UR QL: NORMAL

## 2024-12-07 PROCEDURE — 99213 OFFICE O/P EST LOW 20 MIN: CPT | Performed by: FAMILY MEDICINE

## 2024-12-07 PROCEDURE — 1159F MED LIST DOCD IN RCRD: CPT | Performed by: FAMILY MEDICINE

## 2024-12-07 PROCEDURE — 87086 URINE CULTURE/COLONY COUNT: CPT | Performed by: FAMILY MEDICINE

## 2024-12-07 PROCEDURE — 1160F RVW MEDS BY RX/DR IN RCRD: CPT | Performed by: FAMILY MEDICINE

## 2024-12-07 PROCEDURE — 1126F AMNT PAIN NOTED NONE PRSNT: CPT | Performed by: FAMILY MEDICINE

## 2024-12-07 RX ORDER — MEDROXYPROGESTERONE ACETATE 150 MG/ML
INJECTION, SUSPENSION INTRAMUSCULAR
COMMUNITY
End: 2024-12-07

## 2024-12-07 RX ORDER — FLUTICASONE PROPIONATE 50 MCG
SPRAY, SUSPENSION (ML) NASAL
COMMUNITY
End: 2024-12-07

## 2024-12-07 RX ORDER — CLOTRIMAZOLE 1 %
CREAM (GRAM) TOPICAL
COMMUNITY
End: 2024-12-07

## 2024-12-07 RX ORDER — BENZONATATE 100 MG/1
1 CAPSULE ORAL 3 TIMES DAILY
COMMUNITY
End: 2024-12-07

## 2024-12-07 RX ORDER — NEOMYCIN SULFATE, POLYMYXIN B SULFATE AND HYDROCORTISONE 10; 3.5; 1 MG/ML; MG/ML; [USP'U]/ML
SUSPENSION/ DROPS AURICULAR (OTIC)
COMMUNITY
End: 2024-12-07

## 2024-12-07 RX ORDER — LORATADINE 10 MG/1
1 TABLET ORAL DAILY
COMMUNITY
End: 2024-12-07

## 2024-12-07 RX ORDER — ONDANSETRON 4 MG/1
TABLET, ORALLY DISINTEGRATING ORAL
COMMUNITY
End: 2024-12-07

## 2024-12-07 RX ORDER — PHENAZOPYRIDINE HYDROCHLORIDE 200 MG/1
TABLET, FILM COATED ORAL
COMMUNITY
End: 2024-12-07

## 2024-12-07 RX ORDER — HYDROXYZINE PAMOATE 25 MG/1
1 CAPSULE ORAL 3 TIMES DAILY
COMMUNITY
End: 2024-12-07

## 2024-12-07 RX ORDER — METHOCARBAMOL 500 MG/1
500 TABLET, FILM COATED ORAL 4 TIMES DAILY PRN
Qty: 120 TABLET | Refills: 0 | Status: SHIPPED | OUTPATIENT
Start: 2024-12-07

## 2024-12-07 NOTE — PROGRESS NOTES
Vaishali Ramirez is a 24 y.o. female who presents today for Difficulty Urinating and Back Pain      Patient states for the past 2 weeks she has been having dull pain in the low back mostly on the right side with every time she used the bathroom to urinate or have a BM. She states with BM and urination the pain radiates into the lower abdomen and right hip. She increased her water intake and has been drinking cranberry juice which has helped some. She has not seen blood in her urine. She has some burning pain when she urinates but not with every urination. She has had increase in urinary frequency. She has to strain to completely empty. She denies foul smelling cloudy urine, and urine color changes. She has a soft but formed bowel movement pretty much every day and does not have to strain to have a bowel movement. She denies blood in stool and melena. She has had no injury to her back or change in activity level but does have two toddlers. Bending down and lifting the kids up does worsen the back pain at times. Pain also increases going from sitting to standing.            Review of Systems   Constitutional:  Negative for chills, diaphoresis, fatigue, fever and unexpected weight loss.   HENT:  Negative for congestion, ear pain and sore throat.    Eyes:  Negative for visual disturbance.   Respiratory:  Negative for cough, shortness of breath and wheezing.    Cardiovascular:  Negative for chest pain and palpitations.   Gastrointestinal:  Positive for abdominal pain. Negative for blood in stool, constipation, diarrhea, nausea, vomiting and GERD.   Endocrine: Negative for polydipsia and polyuria.   Genitourinary:  Positive for difficulty urinating, dysuria, flank pain, frequency, pelvic pressure, urgency and urinary incontinence (chronic and not worsening due to pelvic floor dysfunction). Negative for hematuria, pelvic pain, vaginal bleeding, vaginal discharge and vaginal pain.   Musculoskeletal:  Positive for back  pain. Negative for joint swelling.   Skin:  Negative for rash and skin lesions.   Allergic/Immunologic: Negative for environmental allergies.   Neurological:  Negative for seizures and syncope.   Hematological:  Does not bruise/bleed easily.   Psychiatric/Behavioral:  Negative for suicidal ideas.         The following portions of the patient's history were reviewed and updated as appropriate: allergies, current medications, past family history, past medical history, past social history, past surgical history and problem list.    Current Outpatient Medications on File Prior to Visit   Medication Sig Dispense Refill    [DISCONTINUED] benzonatate (TESSALON) 100 MG capsule Take 1 capsule by mouth 3 (Three) Times a Day. (Patient not taking: Reported on 12/7/2024)      [DISCONTINUED] clotrimazole (LOTRIMIN) 1 % cream APPLY TO THE AFFECTED AND SURROUNDING AREAS OF SKIN BY TOPICAL ROUTE 2 TIMES PER DAY IN THE MORNING AND EVENING (Patient not taking: Reported on 12/7/2024)      [DISCONTINUED] fluticasone (FLONASE) 50 MCG/ACT nasal spray Spray 1 spray every day by intranasal route for 30 days. (Patient not taking: Reported on 12/7/2024)      [DISCONTINUED] hydrOXYzine pamoate (VISTARIL) 25 MG capsule Take 1 capsule by mouth 3 (Three) Times a Day. (Patient not taking: Reported on 12/7/2024)      [DISCONTINUED] loratadine (CLARITIN) 10 MG tablet Take 1 tablet by mouth Daily. (Patient not taking: Reported on 12/7/2024)      [DISCONTINUED] medroxyPROGESTERone Acetate 150 MG/ML suspension prefilled syringe  (Patient not taking: Reported on 12/7/2024)      [DISCONTINUED] neomycin-polymyxin-hydrocortisone (CORTISPORIN) 3.5-25817-1 otic suspension INSTILL 4 DROPS INTO AFFECTED EAR(S) BY OTIC ROUTE 3 TIMES PER DAY (Patient not taking: Reported on 12/7/2024)      [DISCONTINUED] ondansetron ODT (ZOFRAN-ODT) 4 MG disintegrating tablet  (Patient not taking: Reported on 12/7/2024)      [DISCONTINUED] phenazopyridine (PYRIDIUM) 200 MG  "tablet Take 1 tablet 3 times a day by oral route for 3 days. (Patient not taking: Reported on 12/7/2024)       No current facility-administered medications on file prior to visit.       No Known Allergies     Visit Vitals  /68 (BP Location: Left arm, Patient Position: Sitting, Cuff Size: Adult)   Pulse 80   Temp 99.1 °F (37.3 °C) (Infrared)   Ht 152.4 cm (60\")   Wt 101 kg (222 lb 6.4 oz)   SpO2 99%   BMI 43.43 kg/m²        Physical Exam  Constitutional:       General: She is not in acute distress.     Appearance: She is well-developed. She is not diaphoretic.   HENT:      Head: Atraumatic.   Cardiovascular:      Rate and Rhythm: Normal rate and regular rhythm.      Heart sounds: Normal heart sounds. No murmur heard.     No friction rub. No gallop.   Pulmonary:      Effort: Pulmonary effort is normal. No respiratory distress.      Breath sounds: Normal breath sounds. No stridor. No wheezing, rhonchi or rales.   Abdominal:      General: Bowel sounds are normal. There is no distension.      Palpations: Abdomen is soft. There is no mass.      Tenderness: There is abdominal tenderness (mild, suprapubic tenderness). There is no right CVA tenderness, left CVA tenderness, guarding or rebound.      Hernia: No hernia is present.   Musculoskeletal:      Cervical back: Normal range of motion and neck supple.      Lumbar back: Spasms (right sided paraspinal muscle) and tenderness (right sided paraspinal muscles) present. No swelling, deformity, lacerations or bony tenderness. Normal range of motion.   Skin:     General: Skin is warm and dry.   Neurological:      Mental Status: She is alert and oriented to person, place, and time.   Psychiatric:         Behavior: Behavior normal.          Results for orders placed or performed in visit on 12/07/24   POCT urinalysis dipstick, automated    Collection Time: 12/07/24 11:31 AM    Specimen: Urine   Result Value Ref Range    Color Dark Yellow Yellow, Straw, Dark Yellow, Mamta    " Clarity, UA Clear Clear    Specific Gravity  1.025 1.005 - 1.030    pH, Urine 6.0 5.0 - 8.0    Leukocytes Negative Negative    Nitrite, UA Negative Negative    Protein, POC Negative Negative mg/dL    Glucose, UA Negative Negative mg/dL    Ketones, UA Negative Negative    Urobilinogen, UA Normal Normal, 0.2 E.U./dL    Bilirubin Negative Negative    Blood, UA Negative Negative    Lot Number [id number]     Expiration Date 01/03/2025         Problems Addressed this Visit          Genitourinary and Reproductive     Painful urination    Relevant Orders    POCT urinalysis dipstick, automated (Completed)    Urine Culture - Urine, Urine, Clean Catch       Musculoskeletal and Injuries    Pelvic floor dysfunction in female     I suspect her urine symptoms are secondary to pelvic floor dysfunction.  Urinalysis does not show signs of infection but does look like she is a little dehydrated.  Patient was encouraged to increase fluid intake.  She can take AZO's as needed for urinary symptoms.  Urine was sent for culture.  Patient was encouraged to restart her pelvic floor exercises that she learned during physical therapy.  RTC/ED precautions given.         Acute right-sided low back pain without sciatica - Primary     I suspect that the pain the patient is having is secondary to muscle strain in the low back.  Patient can take Tylenol and ibuprofen as needed for pain.  She was given methocarbamol to use for muscle spasm.  Patient was also given referral to physical therapy as well as some stretching exercises.  RTC/ED precautions given.         Relevant Medications    methocarbamol (ROBAXIN) 500 MG tablet    Other Relevant Orders    Ambulatory Referral to Physical Therapy for Evaluation & Treatment (Completed)     Diagnoses         Codes Comments    Acute right-sided low back pain without sciatica    -  Primary ICD-10-CM: M54.50  ICD-9-CM: 724.2     Painful urination     ICD-10-CM: R30.9  ICD-9-CM: 788.1     Pelvic floor  dysfunction in female     ICD-10-CM: M62.89  ICD-9-CM: 618.83             Return if symptoms worsen or fail to improve.    Daryn Kennedy MD   12/7/2024

## 2024-12-07 NOTE — ASSESSMENT & PLAN NOTE
I suspect her urine symptoms are secondary to pelvic floor dysfunction.  Urinalysis does not show signs of infection but does look like she is a little dehydrated.  Patient was encouraged to increase fluid intake.  She can take AZO's as needed for urinary symptoms.  Urine was sent for culture.  Patient was encouraged to restart her pelvic floor exercises that she learned during physical therapy.  RTC/ED precautions given.

## 2024-12-07 NOTE — ASSESSMENT & PLAN NOTE
I suspect that the pain the patient is having is secondary to muscle strain in the low back.  Patient can take Tylenol and ibuprofen as needed for pain.  She was given methocarbamol to use for muscle spasm.  Patient was also given referral to physical therapy as well as some stretching exercises.  RTC/ED precautions given.

## 2024-12-09 LAB — BACTERIA SPEC AEROBE CULT: NORMAL

## 2024-12-11 ENCOUNTER — TELEPHONE (OUTPATIENT)
Dept: FAMILY MEDICINE CLINIC | Facility: CLINIC | Age: 24
End: 2024-12-11
Payer: COMMERCIAL

## 2024-12-11 NOTE — TELEPHONE ENCOUNTER
Called patient and left a message for patient to return our call. Also sent to her my chart account.  Please let patient know that her urine culture grew normal urogenital joe.  If patient symptoms fail to improve or worsen please have her return to clinic.

## 2025-01-08 ENCOUNTER — OFFICE VISIT (OUTPATIENT)
Dept: FAMILY MEDICINE CLINIC | Facility: CLINIC | Age: 25
End: 2025-01-08
Payer: COMMERCIAL

## 2025-01-08 VITALS
OXYGEN SATURATION: 97 % | TEMPERATURE: 100.8 F | RESPIRATION RATE: 20 BRPM | HEIGHT: 60 IN | SYSTOLIC BLOOD PRESSURE: 140 MMHG | WEIGHT: 227.8 LBS | DIASTOLIC BLOOD PRESSURE: 80 MMHG | HEART RATE: 95 BPM | BODY MASS INDEX: 44.72 KG/M2

## 2025-01-08 DIAGNOSIS — R11.0 NAUSEA: Primary | ICD-10-CM

## 2025-01-08 DIAGNOSIS — N39.0 URINARY TRACT INFECTION WITHOUT HEMATURIA, SITE UNSPECIFIED: ICD-10-CM

## 2025-01-08 DIAGNOSIS — R35.0 FREQUENCY OF URINATION: ICD-10-CM

## 2025-01-08 DIAGNOSIS — Z30.09 FAMILY PLANNING: ICD-10-CM

## 2025-01-08 LAB
B-HCG UR QL: NEGATIVE
BILIRUB BLD-MCNC: NEGATIVE MG/DL
CLARITY, POC: CLEAR
COLOR UR: YELLOW
EXPIRATION DATE: ABNORMAL
EXPIRATION DATE: NORMAL
GLUCOSE UR STRIP-MCNC: NEGATIVE MG/DL
INTERNAL NEGATIVE CONTROL: NORMAL
INTERNAL POSITIVE CONTROL: NORMAL
KETONES UR QL: NEGATIVE
LEUKOCYTE EST, POC: ABNORMAL
Lab: ABNORMAL
Lab: NORMAL
NITRITE UR-MCNC: NEGATIVE MG/ML
PH UR: 6 [PH] (ref 5–8)
PROT UR STRIP-MCNC: NEGATIVE MG/DL
RBC # UR STRIP: NEGATIVE /UL
SP GR UR: 1.02 (ref 1–1.03)
UROBILINOGEN UR QL: ABNORMAL

## 2025-01-08 RX ORDER — NORGESTIMATE AND ETHINYL ESTRADIOL 0.25-0.035
1 KIT ORAL DAILY
Qty: 28 TABLET | Refills: 1 | Status: SHIPPED | OUTPATIENT
Start: 2025-01-08

## 2025-01-08 RX ORDER — NITROFURANTOIN 25; 75 MG/1; MG/1
100 CAPSULE ORAL EVERY 12 HOURS SCHEDULED
Qty: 14 CAPSULE | Refills: 0 | Status: SHIPPED | OUTPATIENT
Start: 2025-01-08 | End: 2025-01-15

## 2025-01-08 NOTE — PROGRESS NOTES
Follow Up Office Visit      Date: 2025   Patient Name: Vaishali Ramirez  : 2000   MRN: [id number]     Chief Complaint:    Chief Complaint   Patient presents with    Nausea    Loss of Consciousness    Urinary Frequency    Cravings       History of Present Illness: Vaishali Ramirez is a 24 y.o. female who presents today Wants pregnancy test.  Reports morning nausea and urinary frequency.    Sees Mahogany Martínez MD for pcp.    Thinks she is pregnant.  Having symptoms similar to previous 2 pregnancies.    Reports having urinary frequency, and has been having monring nausea.    Report has been nauseated around lunch.      Last known period was   start date and lasted about 10 days per patient report.    Has not checked a pregnancy test at home.    Has history of  2 pregnancies  and 2  vaginal births.                    Subjective      Review of Systems:   Review of Systems   Constitutional:  Negative for chills and fever.   Gastrointestinal:  Positive for vomiting.   Genitourinary:  Positive for frequency. Negative for dysuria.       I have reviewed the patients family history, social history, past medical history, past surgical history and have updated it as appropriate.     Medications:     Current Outpatient Medications:     methocarbamol (ROBAXIN) 500 MG tablet, Take 1 tablet by mouth 4 (Four) Times a Day As Needed for Muscle Spasms., Disp: 120 tablet, Rfl: 0    nitrofurantoin, macrocrystal-monohydrate, (MACROBID) 100 MG capsule, Take 1 capsule by mouth Every 12 (Twelve) Hours for 7 days., Disp: 14 capsule, Rfl: 0    norgestimate-ethinyl estradiol (ORTHO-CYCLEN) 0.25-35 MG-MCG per tablet, Take 1 tablet by mouth Daily., Disp: 28 tablet, Rfl: 1    Allergies:   No Known Allergies    Objective     Physical Exam: Please see above  Vital Signs:   Vitals:    25 1513   BP: 140/80   Pulse: 95   Resp: 20   Temp: (!) 100.8 °F (38.2 °C)   TempSrc: Temporal   SpO2: 97%   Weight: 103 kg (227  "lb 12.8 oz)   Height: 152.4 cm (60\")   PainSc: 0-No pain     Body mass index is 44.49 kg/m².          Physical Exam  Vitals and nursing note reviewed.   Constitutional:       Appearance: Normal appearance.   HENT:      Head: Normocephalic and atraumatic.   Neck:      Vascular: No carotid bruit.   Cardiovascular:      Rate and Rhythm: Normal rate and regular rhythm.      Heart sounds: Normal heart sounds. No murmur heard.  Pulmonary:      Effort: Pulmonary effort is normal.      Breath sounds: Normal breath sounds.   Abdominal:      General: Bowel sounds are normal.      Palpations: Abdomen is soft. There is no mass.      Tenderness: There is no abdominal tenderness.   Musculoskeletal:      Right lower leg: No edema.      Left lower leg: No edema.   Skin:     Coloration: Skin is not jaundiced or pale.      Findings: No erythema.   Neurological:      Mental Status: She is alert. Mental status is at baseline.   Psychiatric:         Mood and Affect: Mood normal.         Behavior: Behavior normal.         Procedures    Results:   Labs:   Hemoglobin A1C   Date Value Ref Range Status   09/26/2024 5.80 (H) 4.80 - 5.60 % Final     External TSH   Date Value Ref Range Status   08/24/2022 1.19 m[IU]/mL Final        POCT Results (if applicable):   Results for orders placed or performed in visit on 01/08/25   POC Pregnancy, Urine    Collection Time: 01/08/25  3:29 PM    Specimen: Urine   Result Value Ref Range    HCG, Urine, QL Negative Negative    Lot Number [id number]     Internal Positive Control Passed Positive, Passed    Internal Negative Control Passed Negative, Passed    Expiration Date 02/07/2025    POC Urinalysis Dipstick, Automated    Collection Time: 01/08/25  3:31 PM    Specimen: Urine   Result Value Ref Range    Color Yellow Yellow, Straw, Dark Yellow, Mamta    Clarity, UA Clear Clear    Specific Gravity  1.020 1.005 - 1.030    pH, Urine 6.0 5.0 - 8.0    Leukocytes Small (1+) (A) Negative    Nitrite, UA Negative " Negative    Protein, POC Negative Negative mg/dL    Glucose, UA Negative Negative mg/dL    Ketones, UA Negative Negative    Urobilinogen, UA 0.2 E.U./dL Normal, 0.2 E.U./dL    Bilirubin Negative Negative    Blood, UA Negative Negative    Lot Number [id number]     Expiration Date 04/10/2025        PHQ-9: PHQ-9 Total Score:       Imaging:   No valid procedures specified.         Assessment / Plan      Assessment/Plan:   Patient feels she may be pregnant, but also reports urinary frequency.  Will check urine pregnancy test, as well as urine dip.  UA indicated UTI.  Will send urine for culture.  Will treat with Macrobid.  Encourage patient drink plenty of water.            1. Nausea    - POC Pregnancy, Urine    2. Frequency of urination    - POC Urinalysis Dipstick, Automated    3. Urinary tract infection without hematuria, site unspecified    - Urine Culture - Urine, Urine, Clean Catch; Future  - nitrofurantoin, macrocrystal-monohydrate, (MACROBID) 100 MG capsule; Take 1 capsule by mouth Every 12 (Twelve) Hours for 7 days.  Dispense: 14 capsule; Refill: 0    4. Family planning  After finding out urine pregnancy test was negative, patient would like to start on birth control pills to help regulate her periods as well as to avoid unwanted pregnancy.  Will start patient on birth control pill as below.  Patient to follow-up with PCP in 1 to 2 months.    - norgestimate-ethinyl estradiol (ORTHO-CYCLEN) 0.25-35 MG-MCG per tablet; Take 1 tablet by mouth Daily.  Dispense: 28 tablet; Refill: 1            Vaccine Counseling:      Follow Up:   Return in about 4 weeks (around 2/5/2025), or with pcp..      DO VIRGINIE Garcia

## 2025-02-03 ENCOUNTER — OFFICE VISIT (OUTPATIENT)
Dept: FAMILY MEDICINE CLINIC | Facility: CLINIC | Age: 25
End: 2025-02-03
Payer: COMMERCIAL

## 2025-02-03 VITALS
HEIGHT: 60 IN | OXYGEN SATURATION: 98 % | TEMPERATURE: 97.8 F | BODY MASS INDEX: 44.1 KG/M2 | RESPIRATION RATE: 18 BRPM | DIASTOLIC BLOOD PRESSURE: 66 MMHG | WEIGHT: 224.6 LBS | HEART RATE: 97 BPM | SYSTOLIC BLOOD PRESSURE: 90 MMHG

## 2025-02-03 DIAGNOSIS — R35.0 URINARY FREQUENCY: Primary | ICD-10-CM

## 2025-02-03 LAB
BILIRUB BLD-MCNC: NEGATIVE MG/DL
CLARITY, POC: CLEAR
COLOR UR: YELLOW
EXPIRATION DATE: NORMAL
GLUCOSE UR STRIP-MCNC: NEGATIVE MG/DL
KETONES UR QL: NEGATIVE
LEUKOCYTE EST, POC: NEGATIVE
Lab: NORMAL
NITRITE UR-MCNC: NEGATIVE MG/ML
PH UR: 7 [PH] (ref 5–8)
PROT UR STRIP-MCNC: NEGATIVE MG/DL
RBC # UR STRIP: NEGATIVE /UL
SP GR UR: 1.01 (ref 1–1.03)
UROBILINOGEN UR QL: NORMAL

## 2025-02-03 PROCEDURE — 1126F AMNT PAIN NOTED NONE PRSNT: CPT | Performed by: FAMILY MEDICINE

## 2025-02-03 PROCEDURE — 99213 OFFICE O/P EST LOW 20 MIN: CPT | Performed by: FAMILY MEDICINE

## 2025-02-03 PROCEDURE — 1159F MED LIST DOCD IN RCRD: CPT | Performed by: FAMILY MEDICINE

## 2025-02-03 PROCEDURE — 1160F RVW MEDS BY RX/DR IN RCRD: CPT | Performed by: FAMILY MEDICINE

## 2025-02-03 NOTE — PROGRESS NOTES
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 female who presents today valuation of urinary frequency.      Sees Dr. Martínez for PCP.      Was seen by me on 2025.  Had reported she thought she was pregnant at last visit.  Was having symptoms related to previous 2 pregnancies      Reports started having urinary frequency about 1.5 months ago.  Had uti.          Subjective      Review of Systems:   Review of Systems   Constitutional:  Negative for chills and fever.   Gastrointestinal:  Negative for nausea and vomiting.   Genitourinary:  Positive for frequency. Negative for dysuria.       I have reviewed the patients family history, social history, past medical history, past surgical history and have updated it as appropriate.     Medications:     Current Outpatient Medications:     methocarbamol (ROBAXIN) 500 MG tablet, Take 1 tablet by mouth 4 (Four) Times a Day As Needed for Muscle Spasms., Disp: 120 tablet, Rfl: 0    norgestimate-ethinyl estradiol (ORTHO-CYCLEN) 0.25-35 MG-MCG per tablet, Take 1 tablet by mouth Daily., Disp: 28 tablet, Rfl: 1    Allergies:   No Known Allergies    Objective     Physical Exam: Please see above  Vital Signs:   Vitals:    25 1318   BP: 90/66   BP Location: Left arm   Patient Position: Sitting   Cuff Size: Adult   Pulse: 97   Resp: 18   Temp: 97.8 °F (36.6 °C)   TempSrc: Temporal   SpO2: 98%   Weight: 102 kg (224 lb 9.6 oz)   Height: 152.4 cm (60\")     Body mass index is 43.86 kg/m².          Physical Exam  Vitals and nursing note reviewed.   Constitutional:       " Appearance: Normal appearance.   HENT:      Head: Normocephalic and atraumatic.   Neck:      Vascular: No carotid bruit.   Cardiovascular:      Rate and Rhythm: Normal rate and regular rhythm.      Heart sounds: Normal heart sounds. No murmur heard.  Pulmonary:      Effort: Pulmonary effort is normal.      Breath sounds: Normal breath sounds.   Abdominal:      General: Bowel sounds are normal.      Palpations: Abdomen is soft. There is no mass.      Tenderness: There is no abdominal tenderness.   Musculoskeletal:      Right lower leg: No edema.      Left lower leg: No edema.   Skin:     Coloration: Skin is not jaundiced or pale.      Findings: No erythema.   Neurological:      Mental Status: She is alert. Mental status is at baseline.   Psychiatric:         Mood and Affect: Mood normal.         Behavior: Behavior normal.         Procedures    Results:   Labs:   Hemoglobin A1C   Date Value Ref Range Status   09/26/2024 5.80 (H) 4.80 - 5.60 % Final     External TSH   Date Value Ref Range Status   08/24/2022 1.19 m[IU]/mL Final        POCT Results (if applicable):   Results for orders placed or performed in visit on 02/03/25   POCT urinalysis dipstick, automated    Collection Time: 02/03/25  1:41 PM    Specimen: Urine   Result Value Ref Range    Color Yellow Yellow, Straw, Dark Yellow, Mamta    Clarity, UA Clear Clear    Specific Gravity  1.015 1.005 - 1.030    pH, Urine 7.0 5.0 - 8.0    Leukocytes Negative Negative    Nitrite, UA Negative Negative    Protein, POC Negative Negative mg/dL    Glucose, UA Negative Negative mg/dL    Ketones, UA Negative Negative    Urobilinogen, UA Normal Normal, 0.2 E.U./dL    Bilirubin Negative Negative    Blood, UA Negative Negative    Lot Number [id number]     Expiration Date 09/19/2026        PHQ-9: PHQ-9 Total Score:       Imaging:   No valid procedures specified.     Measures:   Advanced Care Planning:   Patient does not have an advance directive, declines further  assistance.    Smoking Cessation:   Does not smoke    Assessment / Plan      Assessment/Plan:     UA did not indicate uti.      1. Urinary frequency  Encouraged patient to drink plenty water.    - POCT urinalysis dipstick, automated      Vaccine Counseling:      Follow Up:   Return if symptoms worsen or fail to improve, or as scheduled with pcp..      DO VIRGINIE Garcia

## 2025-03-12 DIAGNOSIS — Z30.09 FAMILY PLANNING: ICD-10-CM

## 2025-03-13 RX ORDER — NORGESTIMATE AND ETHINYL ESTRADIOL 0.25-0.035
1 KIT ORAL DAILY
Qty: 28 TABLET | Refills: 1 | Status: SHIPPED | OUTPATIENT
Start: 2025-03-13

## 2025-04-25 DIAGNOSIS — M54.50 ACUTE RIGHT-SIDED LOW BACK PAIN WITHOUT SCIATICA: ICD-10-CM

## 2025-04-25 DIAGNOSIS — Z30.09 FAMILY PLANNING: ICD-10-CM

## 2025-04-25 RX ORDER — NORGESTIMATE AND ETHINYL ESTRADIOL 0.25-0.035
1 KIT ORAL DAILY
Qty: 28 TABLET | Refills: 1 | Status: SHIPPED | OUTPATIENT
Start: 2025-04-25

## 2025-04-25 RX ORDER — METHOCARBAMOL 500 MG/1
500 TABLET, FILM COATED ORAL 4 TIMES DAILY PRN
Qty: 120 TABLET | Refills: 0 | Status: SHIPPED | OUTPATIENT
Start: 2025-04-25

## 2025-05-19 DIAGNOSIS — Z30.09 FAMILY PLANNING: ICD-10-CM

## 2025-05-19 RX ORDER — NORGESTIMATE AND ETHINYL ESTRADIOL 0.25-0.035
1 KIT ORAL DAILY
Qty: 28 TABLET | Refills: 1 | Status: SHIPPED | OUTPATIENT
Start: 2025-05-19

## 2025-06-17 DIAGNOSIS — Z30.09 FAMILY PLANNING: ICD-10-CM

## 2025-06-18 RX ORDER — NORGESTIMATE AND ETHINYL ESTRADIOL 0.25-0.035
1 KIT ORAL DAILY
Qty: 28 TABLET | Refills: 1 | Status: SHIPPED | OUTPATIENT
Start: 2025-06-18

## 2025-07-19 DIAGNOSIS — Z30.09 FAMILY PLANNING: ICD-10-CM

## 2025-07-21 DIAGNOSIS — Z30.09 FAMILY PLANNING: ICD-10-CM

## 2025-07-21 RX ORDER — NORGESTIMATE AND ETHINYL ESTRADIOL 0.25-0.035
1 KIT ORAL DAILY
Qty: 28 TABLET | Refills: 1 | Status: SHIPPED | OUTPATIENT
Start: 2025-07-21 | End: 2025-07-21 | Stop reason: SDUPTHER

## 2025-07-22 RX ORDER — NORGESTIMATE AND ETHINYL ESTRADIOL 0.25-0.035
1 KIT ORAL DAILY
Qty: 28 TABLET | Refills: 1 | Status: SHIPPED | OUTPATIENT
Start: 2025-07-22

## 2025-08-14 DIAGNOSIS — Z30.09 FAMILY PLANNING: ICD-10-CM

## 2025-08-14 RX ORDER — NORGESTIMATE AND ETHINYL ESTRADIOL 0.25-0.035
1 KIT ORAL DAILY
Qty: 28 TABLET | Refills: 1 | Status: SHIPPED | OUTPATIENT
Start: 2025-08-14